# Patient Record
Sex: MALE | Race: BLACK OR AFRICAN AMERICAN | NOT HISPANIC OR LATINO | Employment: FULL TIME | URBAN - METROPOLITAN AREA
[De-identification: names, ages, dates, MRNs, and addresses within clinical notes are randomized per-mention and may not be internally consistent; named-entity substitution may affect disease eponyms.]

---

## 2017-03-01 ENCOUNTER — OFFICE VISIT (OUTPATIENT)
Dept: INTERNAL MEDICINE | Facility: CLINIC | Age: 22
End: 2017-03-01
Payer: OTHER GOVERNMENT

## 2017-03-01 VITALS
WEIGHT: 161.81 LBS | TEMPERATURE: 98 F | SYSTOLIC BLOOD PRESSURE: 112 MMHG | HEIGHT: 73 IN | HEART RATE: 81 BPM | DIASTOLIC BLOOD PRESSURE: 72 MMHG | BODY MASS INDEX: 21.45 KG/M2

## 2017-03-01 DIAGNOSIS — J30.1 SEASONAL ALLERGIC RHINITIS DUE TO POLLEN: Primary | ICD-10-CM

## 2017-03-01 PROCEDURE — 99213 OFFICE O/P EST LOW 20 MIN: CPT | Mod: S$GLB,,, | Performed by: INTERNAL MEDICINE

## 2017-03-01 PROCEDURE — 1160F RVW MEDS BY RX/DR IN RCRD: CPT | Mod: S$GLB,,, | Performed by: INTERNAL MEDICINE

## 2017-03-01 PROCEDURE — 99999 PR PBB SHADOW E&M-EST. PATIENT-LVL III: CPT | Mod: PBBFAC,,, | Performed by: INTERNAL MEDICINE

## 2017-03-01 RX ORDER — BENZONATATE 200 MG/1
200 CAPSULE ORAL 3 TIMES DAILY PRN
Qty: 60 CAPSULE | Refills: 2 | Status: SHIPPED | OUTPATIENT
Start: 2017-03-01 | End: 2019-02-22 | Stop reason: SDUPTHER

## 2017-03-01 RX ORDER — AZELASTINE 1 MG/ML
1 SPRAY, METERED NASAL 2 TIMES DAILY
Qty: 30 ML | Refills: 2 | Status: SHIPPED | OUTPATIENT
Start: 2017-03-01 | End: 2019-02-22 | Stop reason: SDUPTHER

## 2017-03-01 RX ORDER — FLUTICASONE PROPIONATE 50 MCG
1 SPRAY, SUSPENSION (ML) NASAL DAILY
Qty: 16 G | Refills: 2 | Status: SHIPPED | OUTPATIENT
Start: 2017-03-01 | End: 2019-08-14 | Stop reason: ALTCHOICE

## 2017-03-01 NOTE — PROGRESS NOTES
Subjective:       Patient ID: Kendell Harley is a 21 y.o. male.    Chief Complaint: URI    URI    This is a new problem. The current episode started in the past 7 days. The problem has been unchanged. There has been no fever. Associated symptoms include congestion, coughing, rhinorrhea, sneezing and a sore throat. Pertinent negatives include no abdominal pain, chest pain, diarrhea, headaches, nausea, vomiting or wheezing. He has tried antihistamine and decongestant for the symptoms. The treatment provided mild relief.     Review of Systems   Constitutional: Negative for activity change, appetite change and fever.   HENT: Positive for congestion, rhinorrhea, sneezing and sore throat. Negative for postnasal drip.    Respiratory: Positive for cough. Negative for shortness of breath and wheezing.    Cardiovascular: Negative for chest pain and palpitations.   Gastrointestinal: Negative for abdominal pain, blood in stool, constipation, diarrhea, nausea and vomiting.   Genitourinary: Negative for decreased urine volume, difficulty urinating, flank pain and frequency.   Musculoskeletal: Negative for arthralgias.   Neurological: Negative for dizziness, weakness and headaches.       Objective:      Physical Exam   Constitutional: He is oriented to person, place, and time. He appears well-developed and well-nourished. No distress.   HENT:   Head: Normocephalic and atraumatic.   Right Ear: External ear normal.   Left Ear: External ear normal.   Eyes: Conjunctivae and EOM are normal. Pupils are equal, round, and reactive to light.   Neck: Normal range of motion. Neck supple. No thyromegaly present.   Cardiovascular: Normal rate and regular rhythm.    Pulmonary/Chest: Effort normal and breath sounds normal.   Abdominal: Soft. Bowel sounds are normal. He exhibits no mass. There is no tenderness. There is no rebound and no guarding.   Musculoskeletal: Normal range of motion.   Lymphadenopathy:     He has no cervical adenopathy.    Neurological: He is alert and oriented to person, place, and time. He has normal reflexes. He displays normal reflexes. No cranial nerve deficit. He exhibits normal muscle tone. Coordination normal.   Skin: Skin is warm and dry.       Assessment:       No diagnosis found.    Plan:   There are no diagnoses linked to this encounter.

## 2017-03-01 NOTE — MR AVS SNAPSHOT
[facility] Medicine  [address]  [place] [address]  Phone: [phone]  Fax: [phone]                  Kendell Harley   3/1/2017 10:40 AM   Office Visit    Description:  Male : 1995   Provider:  Isela Lowery MD   Department:  [facility] - Internal Medicine           Reason for Visit     URI                To Do List           Goals (5 Years of Data)     None       These Medications        Disp Refills Start End    azelastine (ASTELIN) 137 mcg (0.1 %) nasal spray 30 mL 2 3/1/2017 3/1/2018    1 spray (137 mcg total) by Nasal route 2 (two) times daily. - Nasal    Pharmacy: [facility] [address] AT [facility] Ph #: [phone]       benzonatate (TESSALON) 200 MG capsule 60 capsule 2 3/1/2017     Take 1 capsule (200 mg total) by mouth 3 (three) times daily as needed for Cough. - Oral    Pharmacy: [facility] [address] [place] [address] [address] AT [facility] Ph #: [phone]       fluticasone (FLONASE) 50 mcg/actuation nasal spray 16 g 2 3/1/2017     1 spray by Each Nare route once daily. - Each Nare    Pharmacy: [facility] [address] AT [facility] Ph #: [phone]         [facility] On Call     [facility] On Call Nurse Care Line -  Assistance  Registered nurses in the [facility] provide clinical advisement, health education, appointment booking, and other advisory services.  Call for this free service at [phone].             Medications           Message regarding Medications     Verify the changes and/or additions to your medication regime listed below are the same as discussed with your clinician today.  If any of these changes or additions are incorrect, please notify your healthcare provider.             Verify that the below list of medications is an accurate representation  of the medications you are currently taking.  If none reported, the list may be blank. If incorrect, please contact your healthcare provider. Carry this list with you in case of emergency.           Current Medications     fluticasone (FLONASE) 50 mcg/actuation nasal spray 1 spray by Each Nare route once daily.    azelastine (ASTELIN) 137 mcg (0.1 %) nasal spray 1 spray (137 mcg total) by Nasal route 2 (two) times daily.    benzonatate (TESSALON) 200 MG capsule Take 1 capsule (200 mg total) by mouth 3 (three) times daily as needed for Cough.           Clinical Reference Information           Your Vitals Were     BP                   112/72 (BP Location: Left arm, Patient Position: Sitting, BP Method: Manual)           Blood Pressure          Most Recent Value    BP  112/72      Allergies as of 3/1/2017     No Known Allergies      Immunizations Administered on Date of Encounter - 3/1/2017     None      [facility] Sign-Up     Activating your [facility] account is as easy as 1-2-3!     1) Visit [email or web address], select Sign Up Now, enter this activation code and your date of birth, then select Next.  [email or web address]  Expires: 4/15/2017 11:03 AM      2) Create a username and password to use when you visit [facility] in the future and select a security question in case you lose your password and select Next.    3) Enter your e-mail address and click Sign Up!    Additional Information  If you have questions, please e-mail [email or web address] or call [phone] to talk to our [facility] staff. Remember, [facility] is NOT to be used for urgent needs. For medical emergencies, dial 911.         Language Assistance Services     ATTENTION: Language assistance services are available, free of charge. Please call [phone].      ATENCIÓN: Si habla abañol, tiene a patterson disposición servicios gratuitos de asistencia lingüística. Llame al [phone].     CHÚ Ý: N?u b?n nói Ti?ng Vi?t, có các d?ch v? h? tr? ngôn ng? mi?n phí  dành cho b?n. G?i s? [phone].         [facility] complies with applicable Federal civil rights laws and does not discriminate on the basis of race, color, national origin, age, disability, or sex.

## 2017-07-07 ENCOUNTER — OFFICE VISIT (OUTPATIENT)
Dept: INTERNAL MEDICINE | Facility: CLINIC | Age: 22
End: 2017-07-07
Payer: OTHER GOVERNMENT

## 2017-07-07 VITALS
DIASTOLIC BLOOD PRESSURE: 63 MMHG | SYSTOLIC BLOOD PRESSURE: 117 MMHG | TEMPERATURE: 98 F | BODY MASS INDEX: 19.76 KG/M2 | HEART RATE: 75 BPM | WEIGHT: 158.94 LBS | HEIGHT: 75 IN

## 2017-07-07 DIAGNOSIS — T63.441A STING FROM HORNET, WASP, OR BEE, ACCIDENTAL OR UNINTENTIONAL, INITIAL ENCOUNTER: ICD-10-CM

## 2017-07-07 DIAGNOSIS — T63.461A STING FROM HORNET, WASP, OR BEE, ACCIDENTAL OR UNINTENTIONAL, INITIAL ENCOUNTER: ICD-10-CM

## 2017-07-07 DIAGNOSIS — T63.451A STING FROM HORNET, WASP, OR BEE, ACCIDENTAL OR UNINTENTIONAL, INITIAL ENCOUNTER: ICD-10-CM

## 2017-07-07 DIAGNOSIS — J30.1 CHRONIC SEASONAL ALLERGIC RHINITIS DUE TO POLLEN: ICD-10-CM

## 2017-07-07 PROBLEM — J30.9 ALLERGIC RHINITIS: Status: ACTIVE | Noted: 2017-07-07

## 2017-07-07 PROCEDURE — 99999 PR PBB SHADOW E&M-EST. PATIENT-LVL III: CPT | Mod: PBBFAC,,,

## 2017-07-07 RX ORDER — IPRATROPIUM BROMIDE 42 UG/1
2 SPRAY, METERED NASAL 3 TIMES DAILY
Qty: 15 ML | Refills: 3 | Status: SHIPPED | OUTPATIENT
Start: 2017-07-07 | End: 2018-05-28 | Stop reason: SDUPTHER

## 2017-07-07 RX ORDER — MONTELUKAST SODIUM 10 MG/1
10 TABLET ORAL DAILY
Qty: 30 TABLET | Refills: 3 | Status: SHIPPED | OUTPATIENT
Start: 2017-07-07 | End: 2017-08-06

## 2017-07-07 NOTE — PROGRESS NOTES
INTERNAL MEDICINE RESIDENT CLINIC  CLINIC NOTE    Patient Name: Kendell Harley  YOB: 1995    PRESENTING HISTORY   Chief Complaint: Bee sting    History of Present Illness:  Mr. Kendell Harley is a 21 y.o. male w/ significant PMHx of seasonal allergies presenting with a bee sting 2 days ago which has produced residual local pain and mild swelling to his right neck for the last 2 days. Witnessed sting by bumblebee, some scratching since. No fever, chills, other systemic symptoms. Has been keeping tobacco leaves on the site to remove toxins. Tetanus UTD    Also complains of a several year history of allergies manifesting as nasal discharge, sinus pain, bags under eyes, sore throat and pain. Sxs are intermittent and moderate in severity. Has been on astelin, tessalon pearls and flonase. Has tried Claritin in the past without noticeable improvement in sxs. Looking for better control of sxs.     Review of Systems:  Constitutional: no fever or chills  Eyes: no visual changes  ENT: + nasal congestion. + PND  Respiratory: + cough, -sputum production  Cardiovascular: no chest pain or palpitations  Gastrointestinal: no nausea or vomiting, no abdominal pain or change in bowel habits  Genitourinary: no hematuria or dysuria  Musculoskeletal: no arthralgias or myalgias  Dermatologic: + redness and swelling to right neck  Neurological: no seizures or tremors    PAST HISTORY:     Past Medical History:   Diagnosis Date    Mononucleosis     diagnosed 9/20/12       No past surgical history on file.    Family History   Problem Relation Age of Onset    Diabetes Mother     Hypertension Mother     Hypertension Father     Diabetes Maternal Grandmother     Hypertension Maternal Grandmother     Cancer Maternal Grandmother     Hypertension Paternal Grandmother     Sudden death Neg Hx     Congenital heart disease Neg Hx        Social History     Social History    Marital status: Single     Spouse name: N/A     "Number of children: N/A    Years of education: N/A     Occupational History    Food       Social History Main Topics    Smoking status: Passive Smoke Exposure - Never Smoker    Smokeless tobacco: None      Comment: father smokes    Alcohol use None    Drug use: No    Sexual activity: Yes     Partners: Female     Birth control/ protection: Condom     Other Topics Concern    None     Social History Narrative    None       MEDICATIONS & ALLERGIES:     Current Outpatient Prescriptions on File Prior to Visit   Medication Sig    azelastine (ASTELIN) 137 mcg (0.1 %) nasal spray 1 spray (137 mcg total) by Nasal route 2 (two) times daily.    benzonatate (TESSALON) 200 MG capsule Take 1 capsule (200 mg total) by mouth 3 (three) times daily as needed for Cough.    fluticasone (FLONASE) 50 mcg/actuation nasal spray 1 spray by Each Nare route once daily.     No current facility-administered medications on file prior to visit.        Review of patient's allergies indicates:  No Known Allergies    OBJECTIVE:   Vital Signs:  Vitals:    07/07/17 1334   BP: 117/63   Pulse: 75   Temp: 98.4 °F (36.9 °C)   TempSrc: Oral   Weight: 72.1 kg (158 lb 15.2 oz)   Height: 6' 3" (1.905 m)        Physical Exam:   General:  Thin male in no acute distress  HEENT:  Nasal turbinates erythematous and edematous. No sinus tenderness. Throat clear with no erythema, exudate or cobble stoning.   CVS:  RRR. No murmurs appreciated  Resp:  Lungs clear to auscultation, no wheezes, rales, rhonchi, cough  MSK:  No joint swelling or erythema  Skin:  Small skin break to the right base of neck consistent with insect sting. Surrounding erythema and mild induration in ellipsoid pattern, approx 6 cm long and 3 centimeters wide. No fluctuance or warmth.   Neuro:  CNII-XII grossly intact. Nl gait  Psych:  Alert and oriented to person, place, and time    ASSESSMENT & PLAN:     Kendell STEPHENS was seen today for insect bite and sinus " problem.    Diagnoses and all orders for this visit:    Seasonal allergic rhinitis due to pollen  -Chronic and uncontrolled on current regimen  -Instructed on nasal rinse technique including use of distilled water.   -     ipratropium (ATROVENT) 0.06 % nasal spray; 2 sprays by Nasal route 3 (three) times daily.  -     montelukast (SINGULAIR) 10 mg tablet; Take 1 tablet (10 mg total) by mouth once daily.    Sting from hornet, wasp, or bee  -With local reaction  -Do not suspect cellulitis  -Instructed on surveillance of site and assessing for spreading of erythema      Dread Rodriguez MD   Internal Medicine PGY-1  [phone]

## 2017-09-05 ENCOUNTER — TELEPHONE (OUTPATIENT)
Dept: INTERNAL MEDICINE | Facility: CLINIC | Age: 22
End: 2017-09-05

## 2017-09-05 NOTE — TELEPHONE ENCOUNTER
----- Message from Natalie Haile sent at 9/5/2017 11:09 AM CDT -----  Contact: Klever/NORRIS/[phone]  Cc: MD Neyda Castro Jr. called in regards needing a 90 days supplies for the ipratropium (ATROVENT) 0.06 % nasal spray. Please call and advise.         Thank you!!!

## 2018-05-28 RX ORDER — IPRATROPIUM BROMIDE 42 UG/1
2 SPRAY, METERED NASAL 3 TIMES DAILY
Qty: 15 ML | Refills: 3 | Status: SHIPPED | OUTPATIENT
Start: 2018-05-28 | End: 2018-05-30 | Stop reason: SDUPTHER

## 2018-05-31 RX ORDER — IPRATROPIUM BROMIDE 42 UG/1
2 SPRAY, METERED NASAL 3 TIMES DAILY
Qty: 45 ML | Refills: 3 | Status: SHIPPED | OUTPATIENT
Start: 2018-05-31 | End: 2018-08-29

## 2019-02-22 ENCOUNTER — OFFICE VISIT (OUTPATIENT)
Dept: INTERNAL MEDICINE | Facility: CLINIC | Age: 24
End: 2019-02-22
Payer: OTHER GOVERNMENT

## 2019-02-22 ENCOUNTER — TELEPHONE (OUTPATIENT)
Dept: INTERNAL MEDICINE | Facility: CLINIC | Age: 24
End: 2019-02-22

## 2019-02-22 ENCOUNTER — IMMUNIZATION (OUTPATIENT)
Dept: INTERNAL MEDICINE | Facility: CLINIC | Age: 24
End: 2019-02-22
Payer: OTHER GOVERNMENT

## 2019-02-22 VITALS
OXYGEN SATURATION: 96 % | WEIGHT: 170 LBS | TEMPERATURE: 98 F | HEART RATE: 77 BPM | DIASTOLIC BLOOD PRESSURE: 70 MMHG | SYSTOLIC BLOOD PRESSURE: 120 MMHG | BODY MASS INDEX: 21.14 KG/M2 | HEIGHT: 75 IN

## 2019-02-22 DIAGNOSIS — Z00.00 ANNUAL PHYSICAL EXAM: Primary | ICD-10-CM

## 2019-02-22 DIAGNOSIS — J30.1 SEASONAL ALLERGIC RHINITIS DUE TO POLLEN: ICD-10-CM

## 2019-02-22 DIAGNOSIS — Z23 NEED FOR INFLUENZA VACCINATION: ICD-10-CM

## 2019-02-22 PROCEDURE — 99395 PREV VISIT EST AGE 18-39: CPT | Mod: S$GLB,,, | Performed by: FAMILY MEDICINE

## 2019-02-22 PROCEDURE — 90686 IIV4 VACC NO PRSV 0.5 ML IM: CPT | Mod: S$GLB,,, | Performed by: NURSE PRACTITIONER

## 2019-02-22 PROCEDURE — 99999 PR PBB SHADOW E&M-EST. PATIENT-LVL III: CPT | Mod: PBBFAC,,, | Performed by: FAMILY MEDICINE

## 2019-02-22 PROCEDURE — 90686 FLU VACCINE (QUAD) GREATER THAN OR EQUAL TO 3YO PRESERVATIVE FREE IM: ICD-10-PCS | Mod: S$GLB,,, | Performed by: NURSE PRACTITIONER

## 2019-02-22 PROCEDURE — 90471 IMMUNIZATION ADMIN: CPT | Mod: S$GLB,,, | Performed by: NURSE PRACTITIONER

## 2019-02-22 PROCEDURE — 99395 PR PREVENTIVE VISIT,EST,18-39: ICD-10-PCS | Mod: S$GLB,,, | Performed by: FAMILY MEDICINE

## 2019-02-22 PROCEDURE — 90471 FLU VACCINE (QUAD) GREATER THAN OR EQUAL TO 3YO PRESERVATIVE FREE IM: ICD-10-PCS | Mod: S$GLB,,, | Performed by: NURSE PRACTITIONER

## 2019-02-22 PROCEDURE — 99999 PR PBB SHADOW E&M-EST. PATIENT-LVL III: ICD-10-PCS | Mod: PBBFAC,,, | Performed by: FAMILY MEDICINE

## 2019-02-22 RX ORDER — BENZONATATE 200 MG/1
200 CAPSULE ORAL 3 TIMES DAILY PRN
Qty: 60 CAPSULE | Refills: 2 | Status: SHIPPED | OUTPATIENT
Start: 2019-02-22 | End: 2019-06-13 | Stop reason: SDUPTHER

## 2019-02-22 RX ORDER — AZELASTINE 1 MG/ML
1 SPRAY, METERED NASAL 2 TIMES DAILY
Qty: 30 ML | Refills: 2 | Status: SHIPPED | OUTPATIENT
Start: 2019-02-22 | End: 2019-03-27 | Stop reason: SDUPTHER

## 2019-02-22 NOTE — PROGRESS NOTES
Subjective:      Patient ID: Kendell Harley is a 23 y.o. male.    Chief Complaint: Establish Care      HPI:  Kendell Harley is a 23 year old male with allergic rhinitis and history of palpitations who presents to clinic today to establish care.    Allergic rhinitis:  Previously used benzonatate PRN associated cough.  Also used to use azelastine nasal spray with improvement.  Requests refills on benzonatate and azelastine.  Used to use Flonase in the past but was changed to azelastine by previous PCP.    Health Care Maintenance:  Influenza vaccination:  Will get today.  Last tetanus booster:  11/5/13      Past Medical History:   Diagnosis Date    Mononucleosis     diagnosed 9/20/12       History reviewed. No pertinent surgical history.    Family History   Problem Relation Age of Onset    Diabetes Mother     Hypertension Mother     Hypertension Father     Diabetes Maternal Grandmother     Hypertension Maternal Grandmother     Cancer Maternal Grandmother     Hypertension Paternal Grandmother     Sudden death Neg Hx     Congenital heart disease Neg Hx        Social History     Socioeconomic History    Marital status: Single     Spouse name: None    Number of children: None    Years of education: None    Highest education level: None   Social Needs    Financial resource strain: None    Food insecurity - worry: None    Food insecurity - inability: None    Transportation needs - medical: None    Transportation needs - non-medical: None   Occupational History    None   Tobacco Use    Smoking status: Passive Smoke Exposure - Never Smoker    Smokeless tobacco: Never Used    Tobacco comment: father smokes   Substance and Sexual Activity    Alcohol use: Yes     Frequency: 2-4 times a month     Drinks per session: 3 or 4    Drug use: No    Sexual activity: Yes     Partners: Female     Birth control/protection: Condom   Other Topics Concern    None   Social History Narrative    None       Review of  "Systems   Constitutional: Negative for chills, fatigue and fever.   HENT: Positive for congestion. Negative for hearing loss, nosebleeds, rhinorrhea, sore throat and trouble swallowing.    Eyes: Negative for pain and visual disturbance.   Respiratory: Positive for cough. Negative for shortness of breath and wheezing.    Cardiovascular: Negative for chest pain and palpitations.   Gastrointestinal: Negative for abdominal distention, abdominal pain, constipation, diarrhea, nausea and vomiting.   Genitourinary: Negative for difficulty urinating, dysuria, frequency, hematuria and urgency.   Musculoskeletal: Negative for arthralgias, back pain and myalgias.   Skin: Negative for color change and rash.   Neurological: Negative for dizziness, syncope, speech difficulty, weakness, numbness and headaches.   Psychiatric/Behavioral: Negative for agitation, behavioral problems and confusion. The patient is not nervous/anxious.      Objective:     Vitals:    02/22/19 1024   BP: 120/70   BP Location: Right arm   Patient Position: Sitting   BP Method: Medium (Manual)   Pulse: 77   Temp: 97.9 °F (36.6 °C)   TempSrc: Oral   SpO2: 96%   Weight: 77.1 kg (170 lb)   Height: 6' 3" (1.905 m)       Physical Exam   Constitutional: He appears well-developed and well-nourished. He is cooperative. No distress.   HENT:   Head: Normocephalic and atraumatic.   Right Ear: Hearing and external ear normal.   Left Ear: Hearing and external ear normal.   Nose: Nose normal. No rhinorrhea. No epistaxis.   Mouth/Throat: Oropharynx is clear and moist and mucous membranes are normal. No oral lesions.   Eyes: Conjunctivae, EOM and lids are normal. Pupils are equal, round, and reactive to light. Right eye exhibits no discharge. Left eye exhibits no discharge.   Neck: Trachea normal and normal range of motion. Neck supple. No tracheal deviation present.   Cardiovascular: Normal rate, regular rhythm and normal heart sounds. Exam reveals no gallop and no friction " rub.   No murmur heard.  Pulmonary/Chest: Effort normal and breath sounds normal. No respiratory distress. He has no wheezes. He has no rales.   Abdominal: Soft. Bowel sounds are normal. He exhibits no distension. There is no tenderness. There is no rebound and no guarding.   Musculoskeletal: Normal range of motion. He exhibits no edema or deformity.   Neurological: He is alert. No cranial nerve deficit. He exhibits normal muscle tone.   Skin: Skin is warm and dry. No rash noted.   Psychiatric: He has a normal mood and affect. His speech is normal and behavior is normal. Judgment and thought content normal. Cognition and memory are normal.   Nursing note and vitals reviewed.     Assessment:      1. Annual physical exam    2. Seasonal allergic rhinitis due to pollen    3. Need for influenza vaccination      Plan:   Kendell STEPHENS was seen today for establish care.    Diagnoses and all orders for this visit:    Annual physical exam  -     CBC auto differential; Future  -     Comprehensive metabolic panel; Future  -     Lipid panel; Future    Seasonal allergic rhinitis due to pollen  -     azelastine (ASTELIN) 137 mcg (0.1 %) nasal spray; 1 spray (137 mcg total) by Nasal route 2 (two) times daily.  -     benzonatate (TESSALON) 200 MG capsule; Take 1 capsule (200 mg total) by mouth 3 (three) times daily as needed for Cough.  -     Symptoms stable; medications refilled; call/return to clinic if no improvement or for any worsening    Need for influenza vaccination        -     Fluzone to be administered today.

## 2019-02-23 ENCOUNTER — LAB VISIT (OUTPATIENT)
Dept: LAB | Facility: HOSPITAL | Age: 24
End: 2019-02-23
Payer: OTHER GOVERNMENT

## 2019-02-23 DIAGNOSIS — Z00.00 ANNUAL PHYSICAL EXAM: ICD-10-CM

## 2019-02-23 LAB
ALBUMIN SERPL BCP-MCNC: 4.2 G/DL
ALP SERPL-CCNC: 77 U/L
ALT SERPL W/O P-5'-P-CCNC: 20 U/L
ANION GAP SERPL CALC-SCNC: 7 MMOL/L
AST SERPL-CCNC: 23 U/L
BASOPHILS # BLD AUTO: 0.03 K/UL
BASOPHILS NFR BLD: 0.7 %
BILIRUB SERPL-MCNC: 0.6 MG/DL
BUN SERPL-MCNC: 7 MG/DL
CALCIUM SERPL-MCNC: 9.3 MG/DL
CHLORIDE SERPL-SCNC: 102 MMOL/L
CHOLEST SERPL-MCNC: 149 MG/DL
CHOLEST/HDLC SERPL: 1.9 {RATIO}
CO2 SERPL-SCNC: 31 MMOL/L
CREAT SERPL-MCNC: 1 MG/DL
DIFFERENTIAL METHOD: ABNORMAL
EOSINOPHIL # BLD AUTO: 0 K/UL
EOSINOPHIL NFR BLD: 0.2 %
ERYTHROCYTE [DISTWIDTH] IN BLOOD BY AUTOMATED COUNT: 13.7 %
EST. GFR  (AFRICAN AMERICAN): >60 ML/MIN/1.73 M^2
EST. GFR  (NON AFRICAN AMERICAN): >60 ML/MIN/1.73 M^2
GLUCOSE SERPL-MCNC: 88 MG/DL
HCT VFR BLD AUTO: 47.1 %
HDLC SERPL-MCNC: 77 MG/DL
HDLC SERPL: 51.7 %
HGB BLD-MCNC: 15.6 G/DL
LDLC SERPL CALC-MCNC: 59.8 MG/DL
LYMPHOCYTES # BLD AUTO: 0.7 K/UL
LYMPHOCYTES NFR BLD: 16.5 %
MCH RBC QN AUTO: 30.7 PG
MCHC RBC AUTO-ENTMCNC: 33.1 G/DL
MCV RBC AUTO: 93 FL
MONOCYTES # BLD AUTO: 0.6 K/UL
MONOCYTES NFR BLD: 14.8 %
NEUTROPHILS # BLD AUTO: 2.8 K/UL
NEUTROPHILS NFR BLD: 67.6 %
NONHDLC SERPL-MCNC: 72 MG/DL
PLATELET # BLD AUTO: 226 K/UL
PMV BLD AUTO: 9.2 FL
POTASSIUM SERPL-SCNC: 3.9 MMOL/L
PROT SERPL-MCNC: 7.2 G/DL
RBC # BLD AUTO: 5.08 M/UL
SODIUM SERPL-SCNC: 140 MMOL/L
TRIGL SERPL-MCNC: 61 MG/DL
WBC # BLD AUTO: 4.12 K/UL

## 2019-02-23 PROCEDURE — 36415 COLL VENOUS BLD VENIPUNCTURE: CPT

## 2019-02-23 PROCEDURE — 80053 COMPREHEN METABOLIC PANEL: CPT

## 2019-02-23 PROCEDURE — 80061 LIPID PANEL: CPT

## 2019-02-23 PROCEDURE — 85025 COMPLETE CBC W/AUTO DIFF WBC: CPT

## 2019-03-27 DIAGNOSIS — J30.1 SEASONAL ALLERGIC RHINITIS DUE TO POLLEN: ICD-10-CM

## 2019-03-27 RX ORDER — AZELASTINE 1 MG/ML
1 SPRAY, METERED NASAL 2 TIMES DAILY
Qty: 30 ML | Refills: 2 | Status: SHIPPED | OUTPATIENT
Start: 2019-03-27 | End: 2019-06-13 | Stop reason: SDUPTHER

## 2019-06-13 DIAGNOSIS — J30.1 SEASONAL ALLERGIC RHINITIS DUE TO POLLEN: ICD-10-CM

## 2019-06-13 RX ORDER — AZELASTINE 1 MG/ML
1 SPRAY, METERED NASAL 2 TIMES DAILY
Qty: 30 ML | Refills: 2 | Status: SHIPPED | OUTPATIENT
Start: 2019-06-13 | End: 2020-05-08 | Stop reason: SDUPTHER

## 2019-06-13 RX ORDER — BENZONATATE 200 MG/1
200 CAPSULE ORAL 3 TIMES DAILY PRN
Qty: 60 CAPSULE | Refills: 2 | Status: SHIPPED | OUTPATIENT
Start: 2019-06-13 | End: 2020-03-26 | Stop reason: SDUPTHER

## 2019-06-17 ENCOUNTER — OFFICE VISIT (OUTPATIENT)
Dept: URGENT CARE | Facility: CLINIC | Age: 24
End: 2019-06-17
Payer: OTHER GOVERNMENT

## 2019-06-17 VITALS
WEIGHT: 170 LBS | RESPIRATION RATE: 16 BRPM | HEART RATE: 86 BPM | HEIGHT: 75 IN | DIASTOLIC BLOOD PRESSURE: 73 MMHG | SYSTOLIC BLOOD PRESSURE: 126 MMHG | OXYGEN SATURATION: 100 % | BODY MASS INDEX: 21.14 KG/M2 | TEMPERATURE: 99 F

## 2019-06-17 DIAGNOSIS — J06.9 URI, ACUTE: Primary | ICD-10-CM

## 2019-06-17 PROCEDURE — 96372 THER/PROPH/DIAG INJ SC/IM: CPT | Mod: S$GLB,,, | Performed by: PREVENTIVE MEDICINE

## 2019-06-17 PROCEDURE — 96372 PR INJECTION,THERAP/PROPH/DIAG2ST, IM OR SUBCUT: ICD-10-PCS | Mod: S$GLB,,, | Performed by: PREVENTIVE MEDICINE

## 2019-06-17 PROCEDURE — 99214 PR OFFICE/OUTPT VISIT, EST, LEVL IV, 30-39 MIN: ICD-10-PCS | Mod: 25,S$GLB,, | Performed by: PHYSICIAN ASSISTANT

## 2019-06-17 PROCEDURE — 99214 OFFICE O/P EST MOD 30 MIN: CPT | Mod: 25,S$GLB,, | Performed by: PHYSICIAN ASSISTANT

## 2019-06-17 RX ORDER — BETAMETHASONE SODIUM PHOSPHATE AND BETAMETHASONE ACETATE 3; 3 MG/ML; MG/ML
9 INJECTION, SUSPENSION INTRA-ARTICULAR; INTRALESIONAL; INTRAMUSCULAR; SOFT TISSUE
Status: COMPLETED | OUTPATIENT
Start: 2019-06-17 | End: 2019-06-17

## 2019-06-17 RX ADMIN — BETAMETHASONE SODIUM PHOSPHATE AND BETAMETHASONE ACETATE 9 MG: 3; 3 INJECTION, SUSPENSION INTRA-ARTICULAR; INTRALESIONAL; INTRAMUSCULAR; SOFT TISSUE at 11:06

## 2019-06-17 NOTE — PROGRESS NOTES
"Subjective:       Patient ID: Kendell Harley is a 23 y.o. male.    Vitals:  height is 6' 3" (1.905 m) and weight is 77.1 kg (170 lb). His temperature is 98.7 °F (37.1 °C). His blood pressure is 126/73 and his pulse is 86. His respiration is 16 and oxygen saturation is 100%.     Chief Complaint: Sinus Problem    Sinus Problem   This is a new problem. The current episode started 1 to 4 weeks ago (x3 wks ). The problem has been gradually worsening since onset. There has been no fever. His pain is at a severity of 10/10. The pain is severe. Associated symptoms include chills, congestion, coughing, diaphoresis, ear pain, headaches, sinus pressure and sneezing. Pertinent negatives include no shortness of breath or sore throat. Treatments tried: nasal spray/claritin. tessalon pearls. The treatment provided mild relief.       Constitution: Positive for chills, sweating and fatigue. Negative for fever.   HENT: Positive for ear pain, congestion, postnasal drip, sinus pain and sinus pressure. Negative for sore throat and voice change.    Neck: Negative for painful lymph nodes.   Eyes: Negative for eye redness.   Respiratory: Positive for cough. Negative for chest tightness, sputum production, bloody sputum, COPD, shortness of breath, stridor, wheezing and asthma.    Gastrointestinal: Negative for nausea and vomiting.   Musculoskeletal: Negative for muscle ache.   Skin: Negative for rash.   Allergic/Immunologic: Positive for sneezing. Negative for seasonal allergies and asthma.   Neurological: Positive for headaches.   Hematologic/Lymphatic: Negative for swollen lymph nodes.       Objective:      Physical Exam   Constitutional: He is oriented to person, place, and time. Vital signs are normal. He appears well-developed and well-nourished. He is active and cooperative.  Non-toxic appearance. He does not have a sickly appearance. He does not appear ill. No distress.   HENT:   Head: Normocephalic and atraumatic.   Right Ear: " Hearing, tympanic membrane, external ear and ear canal normal.   Left Ear: Hearing, tympanic membrane, external ear and ear canal normal.   Nose: Mucosal edema and rhinorrhea present. No nasal deformity. No epistaxis. Right sinus exhibits no maxillary sinus tenderness and no frontal sinus tenderness. Left sinus exhibits no maxillary sinus tenderness and no frontal sinus tenderness.   Mouth/Throat: Uvula is midline and mucous membranes are normal. No trismus in the jaw. Normal dentition. No uvula swelling. Posterior oropharyngeal erythema (mild) present. No posterior oropharyngeal edema. No tonsillar exudate.   Eyes: Pupils are equal, round, and reactive to light. Conjunctivae, EOM and lids are normal. No scleral icterus.   Sclera clear bilat   Neck: Trachea normal, full passive range of motion without pain and phonation normal. Neck supple.   Cardiovascular: Normal rate, regular rhythm, normal heart sounds and intact distal pulses.   Pulses:       Radial pulses are 1+ on the right side, and 1+ on the left side.   Pulmonary/Chest: Effort normal and breath sounds normal. No respiratory distress.   Musculoskeletal: Normal range of motion. He exhibits no edema or deformity.   Neurological: He is alert and oriented to person, place, and time. He has normal strength. Gait normal.   Skin: Skin is warm, dry and intact. Capillary refill takes less than 2 seconds. No rash noted. He is not diaphoretic. No pallor.   Psychiatric: He has a normal mood and affect. His speech is normal and behavior is normal. Judgment and thought content normal. Cognition and memory are normal.   Nursing note and vitals reviewed.      Assessment:       1. URI, acute        Plan:         URI, acute  -     betamethasone acetate-betamethasone sodium phosphate injection 9 mg      Patient Instructions   Continue Astelin nasal spray and benzonatate. Add Claritin D.     Please return here or go to the Emergency Department for any concerns or worsening of  condition.    Please follow up with your primary care doctor or specialist as needed.    If you  smoke, please stop smoking.

## 2019-06-17 NOTE — PATIENT INSTRUCTIONS
Continue Astelin nasal spray and benzonatate. Add Claritin D.     Please return here or go to the Emergency Department for any concerns or worsening of condition.    Please follow up with your primary care doctor or specialist as needed.    If you  smoke, please stop smoking.

## 2019-08-13 ENCOUNTER — PATIENT MESSAGE (OUTPATIENT)
Dept: INTERNAL MEDICINE | Facility: CLINIC | Age: 24
End: 2019-08-13

## 2019-08-14 ENCOUNTER — PATIENT MESSAGE (OUTPATIENT)
Dept: INTERNAL MEDICINE | Facility: CLINIC | Age: 24
End: 2019-08-14

## 2019-08-14 ENCOUNTER — OFFICE VISIT (OUTPATIENT)
Dept: INTERNAL MEDICINE | Facility: CLINIC | Age: 24
End: 2019-08-14
Payer: OTHER GOVERNMENT

## 2019-08-14 VITALS
OXYGEN SATURATION: 98 % | HEIGHT: 75 IN | BODY MASS INDEX: 20.45 KG/M2 | HEART RATE: 92 BPM | SYSTOLIC BLOOD PRESSURE: 140 MMHG | WEIGHT: 164.44 LBS | TEMPERATURE: 99 F | DIASTOLIC BLOOD PRESSURE: 82 MMHG

## 2019-08-14 DIAGNOSIS — J02.9 SORE THROAT: ICD-10-CM

## 2019-08-14 DIAGNOSIS — J30.1 SEASONAL ALLERGIC RHINITIS DUE TO POLLEN: Primary | ICD-10-CM

## 2019-08-14 LAB — DEPRECATED S PYO AG THROAT QL EIA: NEGATIVE

## 2019-08-14 PROCEDURE — 99999 PR PBB SHADOW E&M-EST. PATIENT-LVL IV: ICD-10-PCS | Mod: PBBFAC,,, | Performed by: FAMILY MEDICINE

## 2019-08-14 PROCEDURE — 99999 PR PBB SHADOW E&M-EST. PATIENT-LVL IV: CPT | Mod: PBBFAC,,, | Performed by: FAMILY MEDICINE

## 2019-08-14 PROCEDURE — 99213 PR OFFICE/OUTPT VISIT, EST, LEVL III, 20-29 MIN: ICD-10-PCS | Mod: S$GLB,,, | Performed by: FAMILY MEDICINE

## 2019-08-14 PROCEDURE — 87880 STREP A ASSAY W/OPTIC: CPT

## 2019-08-14 PROCEDURE — 99213 OFFICE O/P EST LOW 20 MIN: CPT | Mod: S$GLB,,, | Performed by: FAMILY MEDICINE

## 2019-08-14 PROCEDURE — 87081 CULTURE SCREEN ONLY: CPT

## 2019-08-14 RX ORDER — TRIAMCINOLONE ACETONIDE 55 UG/1
2 SPRAY, METERED NASAL DAILY
Refills: 0 | COMMUNITY
Start: 2019-08-14 | End: 2020-05-08 | Stop reason: SDUPTHER

## 2019-08-14 RX ORDER — LEVOCETIRIZINE DIHYDROCHLORIDE 5 MG/1
5 TABLET, FILM COATED ORAL NIGHTLY
Qty: 30 TABLET | Refills: 11 | Status: SHIPPED | OUTPATIENT
Start: 2019-08-14 | End: 2020-03-30

## 2019-08-14 NOTE — PROGRESS NOTES
Subjective:      Patient ID: Kendell Harley is a 24 y.o. male.    Chief Complaint: Sinus Problem (life long); Sore Throat (x 2 weeks); and Chills (a while )      HPI:  Kendell Harley is a 24 year old male with allergic rhinitis who presents to clinic complaining of sore throat, sinus problems, and chills.    States he has had worsening sore throat over the past 3 weeks.  Endorses diaphoresis over the past week, chills.  Endorses associated odynophagia and shortness of breath for the past 2 days, denies associated dysphagia, hearing changes, otorrhea, otalgia, epistaxis, chest pain.  Taking Azelastine nasal spray, benzonatate, and Claritin D.  Has tried Flonase in the past without significant improvement.  Has not tried Xyzal.  Denies any recent known sick contacts.  Enquires about going to meet with an allergist.      Past Medical History:   Diagnosis Date    Mononucleosis     diagnosed 9/20/12       History reviewed. No pertinent surgical history.    Family History   Problem Relation Age of Onset    Diabetes Mother     Hypertension Mother     Hypertension Father     Diabetes Maternal Grandmother     Hypertension Maternal Grandmother     Cancer Maternal Grandmother     Hypertension Paternal Grandmother     Sudden death Neg Hx     Congenital heart disease Neg Hx        Social History     Socioeconomic History    Marital status: Single     Spouse name: Not on file    Number of children: Not on file    Years of education: Not on file    Highest education level: Not on file   Occupational History    Not on file   Social Needs    Financial resource strain: Not on file    Food insecurity:     Worry: Not on file     Inability: Not on file    Transportation needs:     Medical: Not on file     Non-medical: Not on file   Tobacco Use    Smoking status: Passive Smoke Exposure - Never Smoker    Smokeless tobacco: Never Used    Tobacco comment: father smokes   Substance and Sexual Activity    Alcohol use:  "Yes     Frequency: 2-4 times a month     Drinks per session: 3 or 4    Drug use: No    Sexual activity: Yes     Partners: Female     Birth control/protection: Condom   Lifestyle    Physical activity:     Days per week: Not on file     Minutes per session: Not on file    Stress: Not on file   Relationships    Social connections:     Talks on phone: Not on file     Gets together: Not on file     Attends Religion service: Not on file     Active member of club or organization: Not on file     Attends meetings of clubs or organizations: Not on file     Relationship status: Not on file   Other Topics Concern    Not on file   Social History Narrative    Not on file       Review of Systems   Constitutional: Positive for chills. Negative for fatigue and fever.   HENT: Positive for congestion, postnasal drip, rhinorrhea and sore throat. Negative for hearing loss, nosebleeds and trouble swallowing.    Eyes: Negative for pain and visual disturbance.   Respiratory: Negative for cough, shortness of breath and wheezing.    Cardiovascular: Negative for chest pain and palpitations.   Gastrointestinal: Negative for abdominal distention, abdominal pain, constipation, diarrhea, nausea and vomiting.   Genitourinary: Negative for difficulty urinating, dysuria, frequency, hematuria and urgency.   Musculoskeletal: Negative for arthralgias, back pain and myalgias.   Skin: Negative for color change and rash.   Neurological: Negative for dizziness, syncope, speech difficulty, weakness, numbness and headaches.   Psychiatric/Behavioral: Negative for agitation, behavioral problems and confusion. The patient is not nervous/anxious.      Objective:     Vitals:    08/14/19 1143   BP: (!) 140/82   BP Location: Left arm   Patient Position: Sitting   BP Method: Medium (Manual)   Pulse: 92   Temp: 98.7 °F (37.1 °C)   TempSrc: Oral   SpO2: 98%   Weight: 74.6 kg (164 lb 7.4 oz)   Height: 6' 3" (1.905 m)       Physical Exam   Constitutional: He " appears well-developed and well-nourished. He is cooperative. No distress.   HENT:   Head: Normocephalic and atraumatic.   Right Ear: Hearing, tympanic membrane, external ear and ear canal normal.   Left Ear: Hearing, tympanic membrane, external ear and ear canal normal.   Nose: Rhinorrhea present. No epistaxis.   Mouth/Throat: Mucous membranes are normal. No oral lesions. Oropharyngeal exudate and posterior oropharyngeal erythema present. No posterior oropharyngeal edema or tonsillar abscesses.   White exudate noted to surface of right tonsil.   Eyes: Pupils are equal, round, and reactive to light. Conjunctivae, EOM and lids are normal. Right eye exhibits no discharge. Left eye exhibits no discharge.   Neck: Trachea normal and normal range of motion. Neck supple. No tracheal deviation present.   Cardiovascular: Normal rate, regular rhythm and normal heart sounds. Exam reveals no gallop and no friction rub.   No murmur heard.  Pulmonary/Chest: Effort normal and breath sounds normal. No respiratory distress. He has no wheezes. He has no rales.   Abdominal: Soft. Bowel sounds are normal. He exhibits no distension. There is no tenderness. There is no rebound and no guarding.   Musculoskeletal: Normal range of motion. He exhibits no edema or deformity.   Neurological: He is alert. No cranial nerve deficit. He exhibits normal muscle tone.   Skin: Skin is warm and dry. No rash noted.   Psychiatric: He has a normal mood and affect. His speech is normal and behavior is normal. Judgment and thought content normal. Cognition and memory are normal.   Nursing note and vitals reviewed.     Assessment:      1. Seasonal allergic rhinitis due to pollen    2. Sore throat      Plan:   Kendell STEPHENS was seen today for sinus problem, sore throat and chills.    Diagnoses and all orders for this visit:    Seasonal allergic rhinitis due to pollen  -     Switch from Claritin to levocetirizine (XYZAL) 5 MG tablet; Take 1 tablet (5 mg total) by  mouth every evening.  -     Switch from azelastine to triamcinolone (NASACORT) 55 mcg nasal inhaler; 2 sprays by Nasal route once daily.  -     Ambulatory Referral to Allergy    Sore throat  -     THROAT SCREEN, RAPID

## 2019-08-16 LAB — BACTERIA THROAT CULT: NORMAL

## 2019-09-11 ENCOUNTER — LAB VISIT (OUTPATIENT)
Dept: LAB | Facility: HOSPITAL | Age: 24
End: 2019-09-11
Attending: ALLERGY & IMMUNOLOGY
Payer: OTHER GOVERNMENT

## 2019-09-11 ENCOUNTER — OFFICE VISIT (OUTPATIENT)
Dept: ALLERGY | Facility: CLINIC | Age: 24
End: 2019-09-11
Payer: OTHER GOVERNMENT

## 2019-09-11 VITALS
DIASTOLIC BLOOD PRESSURE: 82 MMHG | SYSTOLIC BLOOD PRESSURE: 118 MMHG | HEIGHT: 75 IN | WEIGHT: 168 LBS | BODY MASS INDEX: 20.89 KG/M2

## 2019-09-11 DIAGNOSIS — J31.0 CHRONIC RHINITIS: Primary | ICD-10-CM

## 2019-09-11 DIAGNOSIS — J31.0 CHRONIC RHINITIS: ICD-10-CM

## 2019-09-11 LAB
BASOPHILS # BLD AUTO: 0.05 K/UL (ref 0–0.2)
BASOPHILS NFR BLD: 1.2 % (ref 0–1.9)
DIFFERENTIAL METHOD: ABNORMAL
EOSINOPHIL # BLD AUTO: 0.2 K/UL (ref 0–0.5)
EOSINOPHIL NFR BLD: 3.7 % (ref 0–8)
ERYTHROCYTE [DISTWIDTH] IN BLOOD BY AUTOMATED COUNT: 13.8 % (ref 11.5–14.5)
HCT VFR BLD AUTO: 44.6 % (ref 40–54)
HGB BLD-MCNC: 15.6 G/DL (ref 14–18)
IGA SERPL-MCNC: 194 MG/DL (ref 40–350)
IGE SERPL-ACNC: 150 IU/ML (ref 0–100)
IGG SERPL-MCNC: 1211 MG/DL (ref 650–1600)
IGM SERPL-MCNC: 59 MG/DL (ref 50–300)
LYMPHOCYTES # BLD AUTO: 2.1 K/UL (ref 1–4.8)
LYMPHOCYTES NFR BLD: 48.4 % (ref 18–48)
MCH RBC QN AUTO: 31.7 PG (ref 27–31)
MCHC RBC AUTO-ENTMCNC: 35 G/DL (ref 32–36)
MCV RBC AUTO: 91 FL (ref 82–98)
MONOCYTES # BLD AUTO: 0.5 K/UL (ref 0.3–1)
MONOCYTES NFR BLD: 10.4 % (ref 4–15)
NEUTROPHILS # BLD AUTO: 1.6 K/UL (ref 1.8–7.7)
NEUTROPHILS NFR BLD: 36.3 % (ref 38–73)
PLATELET # BLD AUTO: 225 K/UL (ref 150–350)
PMV BLD AUTO: 8.6 FL (ref 9.2–12.9)
RBC # BLD AUTO: 4.92 M/UL (ref 4.6–6.2)
WBC # BLD AUTO: 4.32 K/UL (ref 3.9–12.7)

## 2019-09-11 PROCEDURE — 86003 ALLG SPEC IGE CRUDE XTRC EA: CPT

## 2019-09-11 PROCEDURE — 85025 COMPLETE CBC W/AUTO DIFF WBC: CPT

## 2019-09-11 PROCEDURE — 99999 PR PBB SHADOW E&M-EST. PATIENT-LVL III: ICD-10-PCS | Mod: PBBFAC,,, | Performed by: ALLERGY & IMMUNOLOGY

## 2019-09-11 PROCEDURE — 99999 PR PBB SHADOW E&M-EST. PATIENT-LVL III: CPT | Mod: PBBFAC,,, | Performed by: ALLERGY & IMMUNOLOGY

## 2019-09-11 PROCEDURE — 82784 ASSAY IGA/IGD/IGG/IGM EACH: CPT

## 2019-09-11 PROCEDURE — 86003 ALLG SPEC IGE CRUDE XTRC EA: CPT | Mod: 59

## 2019-09-11 PROCEDURE — 82785 ASSAY OF IGE: CPT

## 2019-09-11 PROCEDURE — 86317 IMMUNOASSAY INFECTIOUS AGENT: CPT

## 2019-09-11 PROCEDURE — 99244 OFF/OP CNSLTJ NEW/EST MOD 40: CPT | Mod: S$GLB,,, | Performed by: ALLERGY & IMMUNOLOGY

## 2019-09-11 PROCEDURE — 99244 PR OFFICE CONSULTATION,LEVEL IV: ICD-10-PCS | Mod: S$GLB,,, | Performed by: ALLERGY & IMMUNOLOGY

## 2019-09-11 NOTE — LETTER
September 11, 2019      Felipe Nieves MD  [address]  [facility] [address]           [facility] All[facility]  [address]  [facility] [address]  Phone: [phone]  Fax: [phone]          Patient: Kendell Harley   MR Number: [id number]   YOB: 1995   Date of Visit: 9/11/2019       Dear Dr. Felipe Nieves:    Thank you for referring Kendell Harley to me for evaluation. Attached you will find relevant portions of my assessment and plan of care.    If you have questions, please do not hesitate to call me. I look forward to following Kendell Harley along with you.    Sincerely,    Landon Kelley MD    Enclosure  CC:  No Recipients    If you would like to receive this communication electronically, please contact [email or web address] or [phone] to request more information on [employer] Link access.    For providers and/or their staff who would like to refer a patient to [facility], please contact us through our one-stop-shop provider referral line, [facility], at [phone].    If you feel you have received this communication in error or would no longer like to receive these types of communications, please e-mail [email or web address]

## 2019-09-11 NOTE — PROGRESS NOTES
Subjective:       Patient ID: Kendell Harley is a 24 y.o. male.    Chief Complaint:  Allergies and Sinus Problem      HPI:   Patient's symptoms include clear rhinorrhea, cough, headaches, itchy eyes, nasal congestion, postnasal drip, pressure sensation in ears, sneezing and watery eyes. Also recurrent halitosis. These symptoms are perennial with seasonal exacerbation. Significantly worse in summer. Current triggers include exposure to pollens. Also reports worse sx's when it's dry outside. The patient has been suffering from these symptoms for approximately 6 years. Sx's [place] in [place] [employer] [place] return to  about 3 years ago. The patient has tried astelin, nasacort, flonase, xyzal, zyrtec with inadequate relief of symptoms. Can't tell than any of them are helpful at all.  Immunotherapy has never been tried. The patient has never had nasal polyps. The patient has no history of asthma. The patient has a history of eczema. as child. Does not take abx for sinusitis frequently. Gets steroids shots almost every summer. Hx pneumonia in HS.  The patient has not had sinus surgery in the past.   [place] in [employer] [place]  Back in  x 3 years  Worse when dry outside    Environmental History: Pets in the home: none.  [place]: hardwood floors  Tobacco Smoke in Home: no    Past Medical History:   Diagnosis Date    Mononucleosis     diagnosed 9/20/12         Family History   Problem Relation Age of Onset    Diabetes Mother     Hypertension Mother     Hypertension Father     Diabetes Maternal Grandmother     Hypertension Maternal Grandmother     Cancer Maternal Grandmother     Hypertension Paternal Grandmother     Sudden death Neg Hx     Congenital heart disease Neg Hx    parents w asthma      Review of Systems   Constitutional: Negative for activity change, fatigue and fever.   HENT: Positive for congestion, postnasal drip and rhinorrhea. Negative for sinus pressure and sneezing.    Eyes:  Negative for discharge, redness and itching.   Respiratory: Negative for cough, shortness of breath and wheezing.    Cardiovascular: Negative for chest pain.   Gastrointestinal: Negative for constipation, diarrhea, nausea and vomiting.   Genitourinary: Negative for difficulty urinating.   Musculoskeletal: Negative for joint swelling and myalgias.   Skin: Negative for rash.   Neurological: Negative for headaches.   Hematological: Does not bruise/bleed easily.   Psychiatric/Behavioral: Negative for behavioral problems and sleep disturbance.          Objective:   Physical Exam   Constitutional: He is oriented to person, place, and time. He appears well-developed and well-nourished. No distress.   HENT:   Head: Normocephalic and atraumatic.   Right Ear: Tympanic membrane and external ear normal.   Left Ear: Tympanic membrane and external ear normal.   Nose: Nose normal.   Mouth/Throat: Oropharynx is clear and moist. No oropharyngeal exudate.   Eyes: Conjunctivae are normal. Right eye exhibits no discharge. Left eye exhibits no discharge.   Neck: Normal range of motion. Neck supple. No thyromegaly present.   Cardiovascular: Normal rate and regular rhythm.   Pulmonary/Chest: Effort normal and breath sounds normal. No respiratory distress. He has no wheezes.   Abdominal: Soft. Bowel sounds are normal. He exhibits no distension. There is no tenderness.   Musculoskeletal: Normal range of motion. He exhibits no edema.   Lymphadenopathy:     He has no cervical adenopathy.   Neurological: He is alert and oriented to person, place, and time. He exhibits normal muscle tone.   Skin: Skin is warm. No rash noted. He is not diaphoretic. No erythema.   Psychiatric: He has a normal mood and affect. His behavior is normal. Judgment and thought content normal.           Assessment:       1. Chronic rhinitis         Plan:       Kendell STEPHENS was seen today for allergies and sinus problem.    Diagnoses and all orders for this visit:    Chronic  rhinitis  -     Immunoglobulins (IgG, IgA, IgM) Quantitative; Future  -     IgE; Future  -     S.pneumoniae IgG Serotypes; Future  -     CBC auto differential; Future  -     Cat epithelium IgE; Future  -     Dog dander IgE; Future  -     D. farinae IgE; Future  -     D. pteronyssinus IgE; Future  -     Aspergillus fumagatus IgE; Future  -     Allergen-Alternaria Alternata; Future  -     Cockroach, American IgE; Future  -     Bahia grass IgE; Future  -     Burt IgE; Future  -     Oak, white IgE; Future  -     Plantain, English IgE; Future  -     Marsh elder, rough IgE; Future  -     Ragweed, short, common IgE; Future  -     Allergen, Pecan Tree IgE; Future  -     Allergen-Barton; Future    liberal use nasal saline rinses  Nasacort 2 sen twice daily  Defer adding further meds pending lab results

## 2019-09-13 LAB
A ALTERNATA IGE QN: 0.46 KU/L
A FUMIGATUS IGE QN: <0.35 KU/L
BAHIA GRASS IGE QN: 3.47 KU/L
CAT DANDER IGE QN: <0.35 KU/L
CEDAR IGE QN: 2.43 KU/L
COMMON RAGWEED IGE QN: 3.52 KU/L
D FARINAE IGE QN: 1.48 KU/L
D PTERONYSS IGE QN: 0.52 KU/L
DEPRECATED A ALTERNATA IGE RAST QL: ABNORMAL
DEPRECATED A FUMIGATUS IGE RAST QL: NORMAL
DEPRECATED BAHIA GRASS IGE RAST QL: ABNORMAL
DEPRECATED CAT DANDER IGE RAST QL: NORMAL
DEPRECATED CEDAR IGE RAST QL: ABNORMAL
DEPRECATED COMMON RAGWEED IGE RAST QL: ABNORMAL
DEPRECATED D FARINAE IGE RAST QL: ABNORMAL
DEPRECATED D PTERONYSS IGE RAST QL: ABNORMAL
DEPRECATED DOG DANDER IGE RAST QL: ABNORMAL
DEPRECATED ELDER IGE RAST QL: ABNORMAL
DEPRECATED ENGL PLANTAIN IGE RAST QL: ABNORMAL
DEPRECATED PECAN/HICK TREE IGE RAST QL: ABNORMAL
DEPRECATED ROACH IGE RAST QL: ABNORMAL
DEPRECATED TIMOTHY IGE RAST QL: ABNORMAL
DEPRECATED WHITE OAK IGE RAST QL: ABNORMAL
DOG DANDER IGE QN: 1.64 KU/L
ELDER IGE QN: 3.53 KU/L
ENGL PLANTAIN IGE QN: 3.4 KU/L
PECAN/HICK TREE IGE QN: 3.28 KU/L
ROACH IGE QN: 2.79 KU/L
TIMOTHY IGE QN: 3.27 KU/L
WHITE OAK IGE QN: 3.24 KU/L

## 2019-09-16 LAB
DEPRECATED S PNEUM 1 IGG SER-MCNC: <0.3 MCG/ML
DEPRECATED S PNEUM12 IGG SER-MCNC: <0.3 MCG/ML
DEPRECATED S PNEUM14 IGG SER-MCNC: <0.3 MCG/ML
DEPRECATED S PNEUM19 IGG SER-MCNC: 0.4 MCG/ML
DEPRECATED S PNEUM23 IGG SER-MCNC: <0.3 MCG/ML
DEPRECATED S PNEUM3 IGG SER-MCNC: 1 MCG/ML
DEPRECATED S PNEUM4 IGG SER-MCNC: <0.3 MCG/ML
DEPRECATED S PNEUM5 IGG SER-MCNC: 0.3 MCG/ML
DEPRECATED S PNEUM8 IGG SER-MCNC: 1.4 MCG/ML
DEPRECATED S PNEUM9 IGG SER-MCNC: <0.3 MCG/ML
S PNEUM DA 18C IGG SER-MCNC: <0.3 MCG/ML
S PNEUM DA 6B IGG SER-MCNC: <0.3 MCG/ML
S PNEUM DA 7F IGG SER-MCNC: 1.2 MCG/ML
S PNEUM DA 9V IGG SER-MCNC: <0.3 MCG/ML

## 2019-09-17 ENCOUNTER — TELEPHONE (OUTPATIENT)
Dept: ALLERGY | Facility: CLINIC | Age: 24
End: 2019-09-17

## 2019-09-17 NOTE — TELEPHONE ENCOUNTER
Called and left a message for the patient to give us a call back in regards to relaying lab results and scheduling a follow up appointment per Dr. Kelley.

## 2019-09-23 ENCOUNTER — PATIENT MESSAGE (OUTPATIENT)
Dept: ALLERGY | Facility: CLINIC | Age: 24
End: 2019-09-23

## 2019-09-24 ENCOUNTER — PATIENT OUTREACH (OUTPATIENT)
Dept: ADMINISTRATIVE | Facility: OTHER | Age: 24
End: 2019-09-24

## 2020-03-26 DIAGNOSIS — J30.1 SEASONAL ALLERGIC RHINITIS DUE TO POLLEN: ICD-10-CM

## 2020-03-26 RX ORDER — BENZONATATE 200 MG/1
200 CAPSULE ORAL 3 TIMES DAILY PRN
Qty: 60 CAPSULE | Refills: 2 | Status: SHIPPED | OUTPATIENT
Start: 2020-03-26 | End: 2020-05-02 | Stop reason: SDUPTHER

## 2020-03-26 NOTE — TELEPHONE ENCOUNTER
Patient was seen recently and is requesting a refill for Tessalon perles for his coughing.  If the Tessalon Perles do not help, he will call back to set up a virtual appointment.

## 2020-03-30 ENCOUNTER — PATIENT MESSAGE (OUTPATIENT)
Dept: INTERNAL MEDICINE | Facility: CLINIC | Age: 25
End: 2020-03-30

## 2020-03-30 DIAGNOSIS — J30.1 SEASONAL ALLERGIC RHINITIS DUE TO POLLEN: Primary | ICD-10-CM

## 2020-03-30 RX ORDER — LORATADINE 10 MG/1
10 TABLET ORAL DAILY
Qty: 30 TABLET | Refills: 11 | Status: SHIPPED | OUTPATIENT
Start: 2020-03-30 | End: 2020-04-03

## 2020-03-30 RX ORDER — MONTELUKAST SODIUM 10 MG/1
10 TABLET ORAL NIGHTLY
Qty: 30 TABLET | Refills: 0 | Status: SHIPPED | OUTPATIENT
Start: 2020-03-30 | End: 2020-04-29

## 2020-04-02 ENCOUNTER — PATIENT MESSAGE (OUTPATIENT)
Dept: ALLERGY | Facility: CLINIC | Age: 25
End: 2020-04-02

## 2020-04-03 ENCOUNTER — OFFICE VISIT (OUTPATIENT)
Dept: ALLERGY | Facility: CLINIC | Age: 25
End: 2020-04-03
Payer: OTHER GOVERNMENT

## 2020-04-03 ENCOUNTER — PATIENT MESSAGE (OUTPATIENT)
Dept: ALLERGY | Facility: CLINIC | Age: 25
End: 2020-04-03

## 2020-04-03 DIAGNOSIS — R76.0 ABNORMAL ANTIBODY TITER: Primary | ICD-10-CM

## 2020-04-03 DIAGNOSIS — J30.1 SEASONAL ALLERGIC RHINITIS DUE TO POLLEN: ICD-10-CM

## 2020-04-03 PROCEDURE — 99213 OFFICE O/P EST LOW 20 MIN: CPT | Mod: 95,,, | Performed by: STUDENT IN AN ORGANIZED HEALTH CARE EDUCATION/TRAINING PROGRAM

## 2020-04-03 PROCEDURE — 99213 PR OFFICE/OUTPT VISIT, EST, LEVL III, 20-29 MIN: ICD-10-PCS | Mod: 95,,, | Performed by: STUDENT IN AN ORGANIZED HEALTH CARE EDUCATION/TRAINING PROGRAM

## 2020-04-03 RX ORDER — METHYLPREDNISOLONE 4 MG/1
TABLET ORAL
Qty: 1 PACKAGE | Refills: 0 | Status: SHIPPED | OUTPATIENT
Start: 2020-04-03 | End: 2020-04-24

## 2020-04-03 RX ORDER — LEVOCETIRIZINE DIHYDROCHLORIDE 5 MG/1
5 TABLET, FILM COATED ORAL NIGHTLY
COMMUNITY
End: 2020-04-03

## 2020-04-03 RX ORDER — LEVOCETIRIZINE DIHYDROCHLORIDE 5 MG/1
TABLET, FILM COATED ORAL
Qty: 90 TABLET | Refills: 3 | Status: SHIPPED | OUTPATIENT
Start: 2020-04-03 | End: 2021-09-28 | Stop reason: SDUPTHER

## 2020-04-03 NOTE — LETTER
April 3, 2020      Enrike Ceja MD  [address]  [place] [address]           [facility] All[facility]  [address] [facility]  [place] [address]  Phone: [phone]          Patient: Kendell Harley   MR Number: [id number]   YOB: 1995   Date of Visit: 4/3/2020       Dear Dr. Enrike Ceja:    Thank you for referring Kendell Harley to me for evaluation. Attached you will find relevant portions of my assessment and plan of care.    If you have questions, please do not hesitate to call me. I look forward to following Kendell Harley along with you.    Sincerely,    Erin Leonardo MD    Enclosure  CC:  No Recipients    If you would like to receive this communication electronically, please contact [email or web address] or [phone] to request more information on [employer] Link access.    For providers and/or their staff who would like to refer a patient to [facility], please contact us through our one-stop-shop provider referral line, [facility], at [phone].    If you feel you have received this communication in error or would no longer like to receive these types of communications, please e-mail [email or web address]

## 2020-04-03 NOTE — PATIENT INSTRUCTIONS
Increase Xyzal to 2 tablets a day    Take steroid Dosepak as directed    Contact us if no improvement              At her convenience, get blood drawn at any [facility] to make sure your system responded to the pneumonia vaccine.

## 2020-04-03 NOTE — PROGRESS NOTES
Subjective:       Patient ID: Kendell Harley is a 24 y.o. male.    The patient location is:  [place],   The chief complaint leading to consultation is:  Allergy flare up  Visit type: Virtual visit with synchronous audio and video  Total time spent with patient:  Approximately 10 min  Each patient to whom he or she provides medical services by telemedicine is:  (1) informed of the relationship between the physician and patient and the respective role of any other health care provider with respect to management of the patient; and (2) notified that he or she may decline to receive medical services by telemedicine and may withdraw from such care at any time.          Chief Complaint:  Allergies (runny nose, itchy eyes) and Cough (scrathy throat)      HPI:   24-year-old male with allergic rhinitis complains of seasonal worsening for several days.    Related medications  Xyzal 1 tablet daily  BentonatePerles as needed  Azelastine nasal spray 2 squirts each nostril daily.    Nasacort--not using  Singulair ?    His main symptom is runny nose.  Other symptoms include sneezing, itchy eyes, and cough the level of his throat.  He denies significant nasal congestion.  Associated symptoms include loss of sense of taste and loss of sense of smell.  He denies any fever, chills, or purulence mucus.  He also denies any chest symptoms.  He has not been adequately helped by meds as above.    Since last visit he has had a Pneumovax.    No new problems or complaints      At his initial visit patient reported that his symptoms include clear rhinorrhea, cough, headaches, itchy eyes, nasal congestion, postnasal drip, pressure sensation in ears, sneezing and watery eyes. Also recurrent halitosis. These symptoms are perennial with seasonal exacerbation. Significantly worse in summer. Current triggers include exposure to pollens. Also reports worse sx's when it's dry outside. The patient has been suffering from these symptoms for  approximately 6 years. [place]x's [place] [employer] [place] to  about 3 years ago. The patient has tried astelin, nasacort, flonase, xyzal, zyrtec with inadequate relief of symptoms. Can't tell than any of them are helpful at all.  Immunotherapy has never been tried. The patient has never had nasal polyps. The patient has no history of asthma. The patient has a history of eczema. as child. Does not take abx for sinusitis frequently. Gets steroids shots almost every summer. Hx pneumonia in HS.  The patient has not had sinus surgery in the past.   [place] [employer] [place]  [place]  x 3 years  Worse when dry outside    Environmental History: Pets in the home: none.  [place]: hardwood floors  Tobacco Smoke in Home: no    Past Medical History:   Diagnosis Date    Allergy     Mononucleosis     diagnosed 9/20/12    Recurrent upper respiratory infection (URI)          Family History   Problem Relation Age of Onset    Diabetes Mother     Hypertension Mother     Hypertension Father     Diabetes Maternal Grandmother     Hypertension Maternal Grandmother     Cancer Maternal Grandmother     Hypertension Paternal Grandmother     Sudden death Neg Hx     Congenital heart disease Neg Hx    parents w asthma      Review of Systems   Constitutional: Negative for activity change, appetite change, chills and fever.   HENT: Positive for rhinorrhea and sneezing.    Eyes: Positive for itching.   Respiratory: Positive for cough. Negative for chest tightness and shortness of breath.    Cardiovascular: Negative for chest pain and palpitations.   Gastrointestinal: Negative for blood in stool and vomiting.   Genitourinary: Negative for dysuria and hematuria.   Musculoskeletal: Negative for arthralgias and myalgias.   Allergic/Immunologic: Positive for environmental allergies. Negative for immunocompromised state.          Objective:   Physical Exam   Constitutional: He is oriented to person, place, and  time. He appears well-developed and well-nourished. No distress.   HENT:   Head: Normocephalic and atraumatic.   Right Ear: Tympanic membrane normal.   Left Ear: Tympanic membrane normal.   Nose: Nose normal.   Mouth/Throat: No oropharyngeal exudate.   Eyes: Conjunctivae are normal. Right eye exhibits no discharge. Left eye exhibits no discharge.   Neck: Neck supple.   No stridor   Pulmonary/Chest: Effort normal. No respiratory distress.   Musculoskeletal: Normal range of motion. He exhibits no edema.   Neurological: He is alert and oriented to person, place, and time.   Skin: Skin is warm. No rash noted. He is not diaphoretic. No erythema.   Psychiatric: He has a normal mood and affect. His behavior is normal. Judgment and thought content normal.           Assessment:       1. Abnormal antibody titer    2. Seasonal allergic rhinitis due to pollen         Plan:       Kendell STEPHENS was seen today for allergies and cough.    Diagnoses and all orders for this visit:        Seasonal allergic rhinitis due to pollen  -     Ambulatory referral/consult to Allergy  -     levocetirizine (XYZAL) 5 MG tablet; Take 2 tablets in the morning.  -     methylPREDNISolone (MEDROL DOSEPACK) 4 mg tablet; use as directed    Abnormal antibody titer  -     S.pneumoniae IgG Serotypes; Future

## 2020-05-02 DIAGNOSIS — J30.1 SEASONAL ALLERGIC RHINITIS DUE TO POLLEN: ICD-10-CM

## 2020-05-04 RX ORDER — BENZONATATE 200 MG/1
200 CAPSULE ORAL 3 TIMES DAILY PRN
Qty: 60 CAPSULE | Refills: 2 | Status: SHIPPED | OUTPATIENT
Start: 2020-05-04 | End: 2020-10-16 | Stop reason: SDUPTHER

## 2020-05-08 DIAGNOSIS — J30.1 SEASONAL ALLERGIC RHINITIS DUE TO POLLEN: ICD-10-CM

## 2020-05-08 RX ORDER — AZELASTINE 1 MG/ML
1 SPRAY, METERED NASAL 2 TIMES DAILY
Qty: 30 ML | Refills: 2 | Status: SHIPPED | OUTPATIENT
Start: 2020-05-08 | End: 2020-08-31

## 2020-05-08 RX ORDER — TRIAMCINOLONE ACETONIDE 55 UG/1
2 SPRAY, METERED NASAL DAILY
Qty: 16.9 ML | Refills: 2 | Status: SHIPPED | OUTPATIENT
Start: 2020-05-08 | End: 2021-03-19

## 2020-05-08 RX ORDER — TRIAMCINOLONE ACETONIDE 55 UG/1
2 SPRAY, METERED NASAL DAILY
Refills: 0 | Status: CANCELLED | COMMUNITY
Start: 2020-05-08

## 2020-10-05 ENCOUNTER — PATIENT MESSAGE (OUTPATIENT)
Dept: ADMINISTRATIVE | Facility: HOSPITAL | Age: 25
End: 2020-10-05

## 2020-10-16 ENCOUNTER — IMMUNIZATION (OUTPATIENT)
Dept: PHARMACY | Facility: CLINIC | Age: 25
End: 2020-10-16
Payer: COMMERCIAL

## 2020-10-16 ENCOUNTER — IMMUNIZATION (OUTPATIENT)
Dept: PHARMACY | Facility: CLINIC | Age: 25
End: 2020-10-16

## 2020-10-16 ENCOUNTER — OFFICE VISIT (OUTPATIENT)
Dept: INTERNAL MEDICINE | Facility: CLINIC | Age: 25
End: 2020-10-16
Payer: COMMERCIAL

## 2020-10-16 VITALS
OXYGEN SATURATION: 99 % | HEIGHT: 75 IN | TEMPERATURE: 98 F | BODY MASS INDEX: 21.22 KG/M2 | HEART RATE: 72 BPM | DIASTOLIC BLOOD PRESSURE: 80 MMHG | SYSTOLIC BLOOD PRESSURE: 108 MMHG | WEIGHT: 170.63 LBS

## 2020-10-16 DIAGNOSIS — Z11.59 NEED FOR HEPATITIS C SCREENING TEST: ICD-10-CM

## 2020-10-16 DIAGNOSIS — Z00.00 ANNUAL PHYSICAL EXAM: ICD-10-CM

## 2020-10-16 DIAGNOSIS — R07.9 RIGHT-SIDED CHEST PAIN: ICD-10-CM

## 2020-10-16 DIAGNOSIS — Z11.4 SCREENING FOR HIV (HUMAN IMMUNODEFICIENCY VIRUS): ICD-10-CM

## 2020-10-16 DIAGNOSIS — J30.1 SEASONAL ALLERGIC RHINITIS DUE TO POLLEN: ICD-10-CM

## 2020-10-16 PROCEDURE — 99999 PR PBB SHADOW E&M-EST. PATIENT-LVL III: ICD-10-PCS | Mod: PBBFAC,,, | Performed by: FAMILY MEDICINE

## 2020-10-16 PROCEDURE — 99395 PREV VISIT EST AGE 18-39: CPT | Mod: S$GLB,,, | Performed by: FAMILY MEDICINE

## 2020-10-16 PROCEDURE — 99395 PR PREVENTIVE VISIT,EST,18-39: ICD-10-PCS | Mod: S$GLB,,, | Performed by: FAMILY MEDICINE

## 2020-10-16 PROCEDURE — 99999 PR PBB SHADOW E&M-EST. PATIENT-LVL III: CPT | Mod: PBBFAC,,, | Performed by: FAMILY MEDICINE

## 2020-10-16 RX ORDER — BENZONATATE 200 MG/1
200 CAPSULE ORAL 3 TIMES DAILY PRN
Qty: 60 CAPSULE | Refills: 2 | Status: SHIPPED | OUTPATIENT
Start: 2020-10-16 | End: 2021-06-09 | Stop reason: SDUPTHER

## 2020-10-16 RX ORDER — MONTELUKAST SODIUM 10 MG/1
10 TABLET ORAL NIGHTLY
Qty: 30 TABLET | Refills: 0 | Status: SHIPPED | OUTPATIENT
Start: 2020-10-16 | End: 2020-11-09

## 2020-10-16 NOTE — PROGRESS NOTES
Subjective:      Patient ID: Kendell Harley is a 25 y.o. male.    Chief Complaint: Annual Exam      HPI:  Kendell Harley is a 25 year old male with allergic rhinitis, dyslipidemia, and history of palpitations who presents to clinic today for annual physical.    AR:  Prescribed azelastine nasal spray, Xyzal 5 mg 2 tabs by mouth daily, and Nasacort 55 mcg 2 sprays once daily.  Has been seen by allergy previously, most recently 4/3/20; given Medrol dosepack at that time.  Had allergy testing positive to dust mites, dog, grass, cockroach, common mold, tree and weed pollens.  Endorses persistent rhinorrhea, itchy throat, headaches.  Did allergy shots with some temporary improvement.  Works outside.  Still on azelastine.  Ran out of benzonatate for associated cough.  States he cannot really see a different with current regimen.      States he has been having a sharp right sided chest pain over the past 2 weeks, intermittent issue, worse with exertion.   States he has a history of an irregular heart beat in 2013--EKG from that time showed sinus rhythm with PVCs.  Denies chest pain currently.    Dyslipidemia:  HDL 77, LDL 59.8 on most recent lipid panel 2/23/19.    Health Care Maintenance:  Influenza vaccination:  Amenable  Last tetanus booster:  11/5/13  HIV screening:  Amenable  Hepatitis C screening:  Amenable  HPV vaccination:  10/25/12, 7/26/13      Past Medical History:   Diagnosis Date    Allergy     Mononucleosis     diagnosed 9/20/12    Recurrent upper respiratory infection (URI)        History reviewed. No pertinent surgical history.    Family History   Problem Relation Age of Onset    Diabetes Mother     Hypertension Mother     Hypertension Father     Diabetes Maternal Grandmother     Hypertension Maternal Grandmother     Cancer Maternal Grandmother     Hypertension Paternal Grandmother     Sudden death Neg Hx     Congenital heart disease Neg Hx        Social History     Socioeconomic History     Marital status: Single     Spouse name: Not on file    Number of children: Not on file    Years of education: Not on file    Highest education level: Not on file   Occupational History    Not on file   Social Needs    Financial resource strain: Not on file    Food insecurity     Worry: Not on file     Inability: Not on file    Transportation needs     Medical: Not on file     Non-medical: Not on file   Tobacco Use    Smoking status: Passive Smoke Exposure - Never Smoker    Smokeless tobacco: Never Used    Tobacco comment: father smokes   Substance and Sexual Activity    Alcohol use: Yes     Frequency: 2-4 times a month     Drinks per session: 3 or 4    Drug use: No    Sexual activity: Yes     Partners: Female     Birth control/protection: Condom   Lifestyle    Physical activity     Days per week: Not on file     Minutes per session: Not on file    Stress: Not on file   Relationships    Social connections     Talks on phone: Not on file     Gets together: Not on file     Attends Orthodox service: Not on file     Active member of club or organization: Not on file     Attends meetings of clubs or organizations: Not on file     Relationship status: Not on file   Other Topics Concern    Not on file   Social History Narrative    Not on file       Review of Systems   Constitutional: Negative for chills, fatigue and fever.   HENT: Positive for rhinorrhea. Negative for congestion, hearing loss, nosebleeds, sore throat and trouble swallowing.    Eyes: Negative for pain and visual disturbance.   Respiratory: Positive for cough. Negative for shortness of breath and wheezing.    Cardiovascular: Negative for palpitations.        + Recent right sided chest pain   Gastrointestinal: Negative for abdominal distention, abdominal pain, constipation, diarrhea, nausea and vomiting.   Genitourinary: Negative for difficulty urinating, dysuria, frequency, hematuria and urgency.   Musculoskeletal: Negative for arthralgias,  "back pain and myalgias.   Skin: Negative for color change and rash.   Allergic/Immunologic: Positive for environmental allergies.   Neurological: Positive for headaches. Negative for dizziness, syncope, speech difficulty, weakness and numbness.   Psychiatric/Behavioral: Negative for agitation, behavioral problems and confusion. The patient is not nervous/anxious.      Objective:     Vitals:    10/16/20 1348   BP: 108/80   BP Location: Left arm   Patient Position: Sitting   BP Method: Medium (Manual)   Pulse: 72   Temp: 98.3 °F (36.8 °C)   TempSrc: Oral   SpO2: 99%   Weight: 77.4 kg (170 lb 10.2 oz)   Height: 6' 3" (1.905 m)       Physical Exam  Vitals signs and nursing note reviewed.   Constitutional:       General: He is not in acute distress.     Appearance: He is well-developed.   HENT:      Head: Normocephalic and atraumatic.      Right Ear: Hearing, tympanic membrane, ear canal and external ear normal.      Left Ear: Hearing, tympanic membrane, ear canal and external ear normal.      Nose: Nose normal. No rhinorrhea.      Mouth/Throat:      Mouth: No oral lesions.      Pharynx: No pharyngeal swelling, oropharyngeal exudate or posterior oropharyngeal erythema.   Eyes:      General: Lids are normal.         Right eye: No discharge.         Left eye: No discharge.      Conjunctiva/sclera: Conjunctivae normal.   Neck:      Musculoskeletal: Neck supple.      Trachea: Trachea normal. No tracheal deviation.   Cardiovascular:      Rate and Rhythm: Normal rate and regular rhythm.      Heart sounds: Normal heart sounds. No murmur. No friction rub. No gallop.    Pulmonary:      Effort: Pulmonary effort is normal. No respiratory distress.      Breath sounds: Normal breath sounds. No wheezing or rales.   Abdominal:      General: Bowel sounds are normal. There is no distension.      Palpations: Abdomen is soft.      Tenderness: There is no abdominal tenderness. There is no guarding or rebound.   Musculoskeletal:         " General: No deformity.   Lymphadenopathy:      Cervical: No cervical adenopathy.   Skin:     General: Skin is warm and dry.      Findings: No rash.   Neurological:      Mental Status: He is alert.      Motor: No abnormal muscle tone.   Psychiatric:         Speech: Speech normal.         Behavior: Behavior normal. Behavior is cooperative.         Thought Content: Thought content normal.         Judgment: Judgment normal.        Assessment:      1. Annual physical exam    2. Need for hepatitis C screening test    3. Right-sided chest pain    4. Screening for HIV (human immunodeficiency virus)    5. Seasonal allergic rhinitis due to pollen      Plan:   Kendell STEPHENS was seen today for annual exam.    Diagnoses and all orders for this visit:    Annual physical exam  -     Lipid Panel; Future  -     Comprehensive Metabolic Panel; Future  -     CBC auto differential; Future  -     Vitamin D; Future  -     Vaccination card for 3rd dose of Gardasil provided    Need for hepatitis C screening test  -     Hepatitis C Antibody; Future    Right-sided chest pain  -     EKG 12-lead; Future; suspect costochondritis, can try OTC anti-inflammatories PRN; RTC if worsening or no improvement.    Screening for HIV (human immunodeficiency virus)  -     HIV 1/2 Ag/Ab (4th Gen); Future    Seasonal allergic rhinitis due to pollen  -     Start montelukast (SINGULAIR) 10 mg tablet; Take 1 tablet (10 mg total) by mouth every evening.  Counseled patient on potential adverse effects; instructed him to discontinue if any occur and notify me.  -     Continue benzonatate (TESSALON) 200 MG capsule; Take 1 capsule (200 mg total) by mouth 3 (three) times daily as needed for Cough, refilled.  Continue Xyzal, azelastine, and Nasacort.  F/u with allergy if no improvement with above.

## 2020-10-17 ENCOUNTER — LAB VISIT (OUTPATIENT)
Dept: LAB | Facility: HOSPITAL | Age: 25
End: 2020-10-17
Payer: COMMERCIAL

## 2020-10-17 DIAGNOSIS — Z00.00 ANNUAL PHYSICAL EXAM: ICD-10-CM

## 2020-10-17 DIAGNOSIS — Z11.59 NEED FOR HEPATITIS C SCREENING TEST: ICD-10-CM

## 2020-10-17 DIAGNOSIS — Z11.4 SCREENING FOR HIV (HUMAN IMMUNODEFICIENCY VIRUS): ICD-10-CM

## 2020-10-17 LAB
25(OH)D3+25(OH)D2 SERPL-MCNC: 35 NG/ML (ref 30–96)
ALBUMIN SERPL BCP-MCNC: 3.9 G/DL (ref 3.5–5.2)
ALP SERPL-CCNC: 67 U/L (ref 55–135)
ALT SERPL W/O P-5'-P-CCNC: 12 U/L (ref 10–44)
ANION GAP SERPL CALC-SCNC: 8 MMOL/L (ref 8–16)
AST SERPL-CCNC: 19 U/L (ref 10–40)
BASOPHILS # BLD AUTO: 0.06 K/UL (ref 0–0.2)
BASOPHILS NFR BLD: 1.3 % (ref 0–1.9)
BILIRUB SERPL-MCNC: 0.4 MG/DL (ref 0.1–1)
BUN SERPL-MCNC: 7 MG/DL (ref 6–20)
CALCIUM SERPL-MCNC: 9.3 MG/DL (ref 8.7–10.5)
CHLORIDE SERPL-SCNC: 104 MMOL/L (ref 95–110)
CHOLEST SERPL-MCNC: 135 MG/DL (ref 120–199)
CHOLEST/HDLC SERPL: 2.3 {RATIO} (ref 2–5)
CO2 SERPL-SCNC: 28 MMOL/L (ref 23–29)
CREAT SERPL-MCNC: 1 MG/DL (ref 0.5–1.4)
DIFFERENTIAL METHOD: ABNORMAL
EOSINOPHIL # BLD AUTO: 0.2 K/UL (ref 0–0.5)
EOSINOPHIL NFR BLD: 5.1 % (ref 0–8)
ERYTHROCYTE [DISTWIDTH] IN BLOOD BY AUTOMATED COUNT: 13.9 % (ref 11.5–14.5)
EST. GFR  (AFRICAN AMERICAN): >60 ML/MIN/1.73 M^2
EST. GFR  (NON AFRICAN AMERICAN): >60 ML/MIN/1.73 M^2
GLUCOSE SERPL-MCNC: 89 MG/DL (ref 70–110)
HCT VFR BLD AUTO: 45.5 % (ref 40–54)
HDLC SERPL-MCNC: 60 MG/DL (ref 40–75)
HDLC SERPL: 44.4 % (ref 20–50)
HGB BLD-MCNC: 15 G/DL (ref 14–18)
IMM GRANULOCYTES # BLD AUTO: 0.01 K/UL (ref 0–0.04)
IMM GRANULOCYTES NFR BLD AUTO: 0.2 % (ref 0–0.5)
LDLC SERPL CALC-MCNC: 64.8 MG/DL (ref 63–159)
LYMPHOCYTES # BLD AUTO: 1.5 K/UL (ref 1–4.8)
LYMPHOCYTES NFR BLD: 31.3 % (ref 18–48)
MCH RBC QN AUTO: 31.9 PG (ref 27–31)
MCHC RBC AUTO-ENTMCNC: 33 G/DL (ref 32–36)
MCV RBC AUTO: 97 FL (ref 82–98)
MONOCYTES # BLD AUTO: 0.8 K/UL (ref 0.3–1)
MONOCYTES NFR BLD: 16.7 % (ref 4–15)
NEUTROPHILS # BLD AUTO: 2.1 K/UL (ref 1.8–7.7)
NEUTROPHILS NFR BLD: 45.4 % (ref 38–73)
NONHDLC SERPL-MCNC: 75 MG/DL
NRBC BLD-RTO: 0 /100 WBC
PLATELET # BLD AUTO: 213 K/UL (ref 150–350)
PMV BLD AUTO: 9 FL (ref 9.2–12.9)
POTASSIUM SERPL-SCNC: 4.2 MMOL/L (ref 3.5–5.1)
PROT SERPL-MCNC: 7 G/DL (ref 6–8.4)
RBC # BLD AUTO: 4.7 M/UL (ref 4.6–6.2)
SODIUM SERPL-SCNC: 140 MMOL/L (ref 136–145)
TRIGL SERPL-MCNC: 51 MG/DL (ref 30–150)
WBC # BLD AUTO: 4.67 K/UL (ref 3.9–12.7)

## 2020-10-17 PROCEDURE — 80053 COMPREHEN METABOLIC PANEL: CPT

## 2020-10-17 PROCEDURE — 85025 COMPLETE CBC W/AUTO DIFF WBC: CPT

## 2020-10-17 PROCEDURE — 86703 HIV-1/HIV-2 1 RESULT ANTBDY: CPT

## 2020-10-17 PROCEDURE — 86803 HEPATITIS C AB TEST: CPT

## 2020-10-17 PROCEDURE — 80061 LIPID PANEL: CPT

## 2020-10-17 PROCEDURE — 82306 VITAMIN D 25 HYDROXY: CPT

## 2020-10-17 PROCEDURE — 36415 COLL VENOUS BLD VENIPUNCTURE: CPT

## 2020-10-19 LAB
HCV AB SERPL QL IA: NEGATIVE
HIV 1+2 AB+HIV1 P24 AG SERPL QL IA: NEGATIVE

## 2020-11-08 DIAGNOSIS — J30.1 SEASONAL ALLERGIC RHINITIS DUE TO POLLEN: ICD-10-CM

## 2020-11-09 RX ORDER — MONTELUKAST SODIUM 10 MG/1
TABLET ORAL
Qty: 30 TABLET | Refills: 11 | Status: SHIPPED | OUTPATIENT
Start: 2020-11-09 | End: 2021-03-19

## 2021-03-19 ENCOUNTER — OFFICE VISIT (OUTPATIENT)
Dept: INTERNAL MEDICINE | Facility: CLINIC | Age: 26
End: 2021-03-19
Payer: COMMERCIAL

## 2021-03-19 VITALS
HEIGHT: 75 IN | WEIGHT: 168.63 LBS | HEART RATE: 94 BPM | BODY MASS INDEX: 20.97 KG/M2 | DIASTOLIC BLOOD PRESSURE: 88 MMHG | OXYGEN SATURATION: 97 % | TEMPERATURE: 99 F | SYSTOLIC BLOOD PRESSURE: 118 MMHG

## 2021-03-19 DIAGNOSIS — L60.8 ACQUIRED DEFORMITY OF TOENAIL: ICD-10-CM

## 2021-03-19 DIAGNOSIS — L60.0 INGROWN TOENAIL OF LEFT FOOT: ICD-10-CM

## 2021-03-19 PROCEDURE — 99213 PR OFFICE/OUTPT VISIT, EST, LEVL III, 20-29 MIN: ICD-10-PCS | Mod: S$GLB,,, | Performed by: FAMILY MEDICINE

## 2021-03-19 PROCEDURE — 99999 PR PBB SHADOW E&M-EST. PATIENT-LVL IV: CPT | Mod: PBBFAC,,, | Performed by: FAMILY MEDICINE

## 2021-03-19 PROCEDURE — 99213 OFFICE O/P EST LOW 20 MIN: CPT | Mod: S$GLB,,, | Performed by: FAMILY MEDICINE

## 2021-03-19 PROCEDURE — 99999 PR PBB SHADOW E&M-EST. PATIENT-LVL IV: ICD-10-PCS | Mod: PBBFAC,,, | Performed by: FAMILY MEDICINE

## 2021-04-20 ENCOUNTER — PATIENT MESSAGE (OUTPATIENT)
Dept: INTERNAL MEDICINE | Facility: CLINIC | Age: 26
End: 2021-04-20

## 2021-04-27 ENCOUNTER — OFFICE VISIT (OUTPATIENT)
Dept: PODIATRY | Facility: CLINIC | Age: 26
End: 2021-04-27
Payer: COMMERCIAL

## 2021-04-27 VITALS
HEART RATE: 82 BPM | SYSTOLIC BLOOD PRESSURE: 133 MMHG | HEIGHT: 75 IN | BODY MASS INDEX: 21.6 KG/M2 | DIASTOLIC BLOOD PRESSURE: 81 MMHG | WEIGHT: 173.75 LBS

## 2021-04-27 DIAGNOSIS — Q82.8 POROKERATOSIS: Primary | ICD-10-CM

## 2021-04-27 DIAGNOSIS — M79.675 PAIN OF TOE OF LEFT FOOT: ICD-10-CM

## 2021-04-27 PROCEDURE — 99203 PR OFFICE/OUTPT VISIT, NEW, LEVL III, 30-44 MIN: ICD-10-PCS | Mod: S$GLB,,, | Performed by: PODIATRIST

## 2021-04-27 PROCEDURE — 99999 PR PBB SHADOW E&M-EST. PATIENT-LVL III: CPT | Mod: PBBFAC,,, | Performed by: PODIATRIST

## 2021-04-27 PROCEDURE — 99999 PR PBB SHADOW E&M-EST. PATIENT-LVL III: ICD-10-PCS | Mod: PBBFAC,,, | Performed by: PODIATRIST

## 2021-04-27 PROCEDURE — 99203 OFFICE O/P NEW LOW 30 MIN: CPT | Mod: S$GLB,,, | Performed by: PODIATRIST

## 2021-06-09 DIAGNOSIS — J30.1 SEASONAL ALLERGIC RHINITIS DUE TO POLLEN: ICD-10-CM

## 2021-06-09 RX ORDER — AZELASTINE 1 MG/ML
1 SPRAY, METERED NASAL 2 TIMES DAILY
Qty: 30 ML | Refills: 2 | Status: SHIPPED | OUTPATIENT
Start: 2021-06-09 | End: 2021-09-28 | Stop reason: SDUPTHER

## 2021-06-09 RX ORDER — BENZONATATE 200 MG/1
200 CAPSULE ORAL 3 TIMES DAILY PRN
Qty: 60 CAPSULE | Refills: 2 | Status: SHIPPED | OUTPATIENT
Start: 2021-06-09 | End: 2021-09-28

## 2021-06-10 ENCOUNTER — PATIENT MESSAGE (OUTPATIENT)
Dept: PODIATRY | Facility: CLINIC | Age: 26
End: 2021-06-10

## 2021-06-10 ENCOUNTER — TELEPHONE (OUTPATIENT)
Dept: PODIATRY | Facility: CLINIC | Age: 26
End: 2021-06-10

## 2021-07-01 ENCOUNTER — OFFICE VISIT (OUTPATIENT)
Dept: PODIATRY | Facility: CLINIC | Age: 26
End: 2021-07-01
Payer: COMMERCIAL

## 2021-07-01 VITALS
DIASTOLIC BLOOD PRESSURE: 75 MMHG | BODY MASS INDEX: 21.6 KG/M2 | WEIGHT: 173.75 LBS | SYSTOLIC BLOOD PRESSURE: 132 MMHG | HEART RATE: 76 BPM | HEIGHT: 75 IN

## 2021-07-01 DIAGNOSIS — M79.675 PAIN OF TOE OF LEFT FOOT: Primary | ICD-10-CM

## 2021-07-01 PROCEDURE — 99999 PR PBB SHADOW E&M-EST. PATIENT-LVL III: ICD-10-PCS | Mod: PBBFAC,,, | Performed by: PODIATRIST

## 2021-07-01 PROCEDURE — 99999 PR PBB SHADOW E&M-EST. PATIENT-LVL III: CPT | Mod: PBBFAC,,, | Performed by: PODIATRIST

## 2021-07-01 PROCEDURE — 99213 PR OFFICE/OUTPT VISIT, EST, LEVL III, 20-29 MIN: ICD-10-PCS | Mod: S$GLB,,, | Performed by: PODIATRIST

## 2021-07-01 PROCEDURE — 99213 OFFICE O/P EST LOW 20 MIN: CPT | Mod: S$GLB,,, | Performed by: PODIATRIST

## 2021-09-28 ENCOUNTER — LAB VISIT (OUTPATIENT)
Dept: LAB | Facility: HOSPITAL | Age: 26
End: 2021-09-28

## 2021-09-28 ENCOUNTER — OFFICE VISIT (OUTPATIENT)
Dept: INTERNAL MEDICINE | Facility: CLINIC | Age: 26
End: 2021-09-28

## 2021-09-28 VITALS
SYSTOLIC BLOOD PRESSURE: 118 MMHG | WEIGHT: 174.81 LBS | HEIGHT: 75 IN | TEMPERATURE: 99 F | HEART RATE: 72 BPM | BODY MASS INDEX: 21.73 KG/M2 | OXYGEN SATURATION: 98 % | DIASTOLIC BLOOD PRESSURE: 88 MMHG

## 2021-09-28 DIAGNOSIS — Z00.00 ANNUAL PHYSICAL EXAM: Primary | ICD-10-CM

## 2021-09-28 DIAGNOSIS — Z00.00 ANNUAL PHYSICAL EXAM: ICD-10-CM

## 2021-09-28 DIAGNOSIS — J30.1 SEASONAL ALLERGIC RHINITIS DUE TO POLLEN: ICD-10-CM

## 2021-09-28 DIAGNOSIS — R82.90 ABNORMAL URINE ODOR: ICD-10-CM

## 2021-09-28 LAB
25(OH)D3+25(OH)D2 SERPL-MCNC: 33 NG/ML (ref 30–96)
ALBUMIN SERPL BCP-MCNC: 4.2 G/DL (ref 3.5–5.2)
ALP SERPL-CCNC: 57 U/L (ref 55–135)
ALT SERPL W/O P-5'-P-CCNC: 17 U/L (ref 10–44)
ANION GAP SERPL CALC-SCNC: 9 MMOL/L (ref 8–16)
AST SERPL-CCNC: 19 U/L (ref 10–40)
BASOPHILS # BLD AUTO: 0.06 K/UL (ref 0–0.2)
BASOPHILS NFR BLD: 1.4 % (ref 0–1.9)
BILIRUB SERPL-MCNC: 0.6 MG/DL (ref 0.1–1)
BILIRUB UR QL STRIP: NEGATIVE
BUN SERPL-MCNC: 8 MG/DL (ref 6–20)
CALCIUM SERPL-MCNC: 10.1 MG/DL (ref 8.7–10.5)
CHLORIDE SERPL-SCNC: 104 MMOL/L (ref 95–110)
CHOLEST SERPL-MCNC: 159 MG/DL (ref 120–199)
CHOLEST/HDLC SERPL: 2.6 {RATIO} (ref 2–5)
CLARITY UR REFRACT.AUTO: CLEAR
CO2 SERPL-SCNC: 28 MMOL/L (ref 23–29)
COLOR UR AUTO: YELLOW
CREAT SERPL-MCNC: 1.2 MG/DL (ref 0.5–1.4)
DIFFERENTIAL METHOD: ABNORMAL
EOSINOPHIL # BLD AUTO: 0.3 K/UL (ref 0–0.5)
EOSINOPHIL NFR BLD: 6.4 % (ref 0–8)
ERYTHROCYTE [DISTWIDTH] IN BLOOD BY AUTOMATED COUNT: 13.4 % (ref 11.5–14.5)
EST. GFR  (AFRICAN AMERICAN): >60 ML/MIN/1.73 M^2
EST. GFR  (NON AFRICAN AMERICAN): >60 ML/MIN/1.73 M^2
GLUCOSE SERPL-MCNC: 105 MG/DL (ref 70–110)
GLUCOSE UR QL STRIP: NEGATIVE
HCT VFR BLD AUTO: 47.2 % (ref 40–54)
HDLC SERPL-MCNC: 62 MG/DL (ref 40–75)
HDLC SERPL: 39 % (ref 20–50)
HGB BLD-MCNC: 16.2 G/DL (ref 14–18)
HGB UR QL STRIP: NEGATIVE
IMM GRANULOCYTES # BLD AUTO: 0.01 K/UL (ref 0–0.04)
IMM GRANULOCYTES NFR BLD AUTO: 0.2 % (ref 0–0.5)
KETONES UR QL STRIP: NEGATIVE
LDLC SERPL CALC-MCNC: 83.6 MG/DL (ref 63–159)
LEUKOCYTE ESTERASE UR QL STRIP: NEGATIVE
LYMPHOCYTES # BLD AUTO: 1.8 K/UL (ref 1–4.8)
LYMPHOCYTES NFR BLD: 40.7 % (ref 18–48)
MCH RBC QN AUTO: 32.2 PG (ref 27–31)
MCHC RBC AUTO-ENTMCNC: 34.3 G/DL (ref 32–36)
MCV RBC AUTO: 94 FL (ref 82–98)
MONOCYTES # BLD AUTO: 0.4 K/UL (ref 0.3–1)
MONOCYTES NFR BLD: 8.7 % (ref 4–15)
NEUTROPHILS # BLD AUTO: 1.9 K/UL (ref 1.8–7.7)
NEUTROPHILS NFR BLD: 42.6 % (ref 38–73)
NITRITE UR QL STRIP: NEGATIVE
NONHDLC SERPL-MCNC: 97 MG/DL
NRBC BLD-RTO: 0 /100 WBC
PH UR STRIP: 6 [PH] (ref 5–8)
PLATELET # BLD AUTO: 224 K/UL (ref 150–450)
PMV BLD AUTO: 9.1 FL (ref 9.2–12.9)
POTASSIUM SERPL-SCNC: 4.3 MMOL/L (ref 3.5–5.1)
PROT SERPL-MCNC: 7.2 G/DL (ref 6–8.4)
PROT UR QL STRIP: NEGATIVE
RBC # BLD AUTO: 5.03 M/UL (ref 4.6–6.2)
SODIUM SERPL-SCNC: 141 MMOL/L (ref 136–145)
SP GR UR STRIP: 1.02 (ref 1–1.03)
TRIGL SERPL-MCNC: 67 MG/DL (ref 30–150)
URN SPEC COLLECT METH UR: NORMAL
WBC # BLD AUTO: 4.35 K/UL (ref 3.9–12.7)

## 2021-09-28 PROCEDURE — 99999 PR PBB SHADOW E&M-EST. PATIENT-LVL III: CPT | Mod: PBBFAC,,, | Performed by: FAMILY MEDICINE

## 2021-09-28 PROCEDURE — 99213 OFFICE O/P EST LOW 20 MIN: CPT | Mod: PBBFAC | Performed by: FAMILY MEDICINE

## 2021-09-28 PROCEDURE — 36415 COLL VENOUS BLD VENIPUNCTURE: CPT | Performed by: FAMILY MEDICINE

## 2021-09-28 PROCEDURE — 82306 VITAMIN D 25 HYDROXY: CPT | Performed by: FAMILY MEDICINE

## 2021-09-28 PROCEDURE — 80061 LIPID PANEL: CPT | Performed by: FAMILY MEDICINE

## 2021-09-28 PROCEDURE — 85025 COMPLETE CBC W/AUTO DIFF WBC: CPT | Performed by: FAMILY MEDICINE

## 2021-09-28 PROCEDURE — 81003 URINALYSIS AUTO W/O SCOPE: CPT | Performed by: FAMILY MEDICINE

## 2021-09-28 PROCEDURE — 99999 PR PBB SHADOW E&M-EST. PATIENT-LVL III: ICD-10-PCS | Mod: PBBFAC,,, | Performed by: FAMILY MEDICINE

## 2021-09-28 PROCEDURE — 80053 COMPREHEN METABOLIC PANEL: CPT | Performed by: FAMILY MEDICINE

## 2021-09-28 PROCEDURE — 99395 PR PREVENTIVE VISIT,EST,18-39: ICD-10-PCS | Mod: S$PBB,,, | Performed by: FAMILY MEDICINE

## 2021-09-28 PROCEDURE — 99395 PREV VISIT EST AGE 18-39: CPT | Mod: S$PBB,,, | Performed by: FAMILY MEDICINE

## 2021-09-28 RX ORDER — LEVOCETIRIZINE DIHYDROCHLORIDE 5 MG/1
TABLET, FILM COATED ORAL
Qty: 180 TABLET | Refills: 3 | Status: SHIPPED | OUTPATIENT
Start: 2021-09-28 | End: 2024-01-24 | Stop reason: SDUPTHER

## 2021-09-28 RX ORDER — AZELASTINE 1 MG/ML
1 SPRAY, METERED NASAL 2 TIMES DAILY
Qty: 30 ML | Refills: 11 | Status: SHIPPED | OUTPATIENT
Start: 2021-09-28 | End: 2022-09-28

## 2021-12-10 ENCOUNTER — OFFICE VISIT (OUTPATIENT)
Dept: URGENT CARE | Facility: CLINIC | Age: 26
End: 2021-12-10
Payer: COMMERCIAL

## 2021-12-10 VITALS
BODY MASS INDEX: 21.76 KG/M2 | HEIGHT: 75 IN | HEART RATE: 60 BPM | RESPIRATION RATE: 20 BRPM | WEIGHT: 175 LBS | SYSTOLIC BLOOD PRESSURE: 122 MMHG | OXYGEN SATURATION: 98 % | DIASTOLIC BLOOD PRESSURE: 81 MMHG | TEMPERATURE: 100 F

## 2021-12-10 DIAGNOSIS — J32.9 SINUSITIS, UNSPECIFIED CHRONICITY, UNSPECIFIED LOCATION: ICD-10-CM

## 2021-12-10 DIAGNOSIS — H66.001 ACUTE SUPPURATIVE OTITIS MEDIA OF RIGHT EAR WITHOUT SPONTANEOUS RUPTURE OF TYMPANIC MEMBRANE, RECURRENCE NOT SPECIFIED: Primary | ICD-10-CM

## 2021-12-10 LAB
CTP QC/QA: YES
SARS-COV-2 RDRP RESP QL NAA+PROBE: NEGATIVE

## 2021-12-10 PROCEDURE — 99203 PR OFFICE/OUTPT VISIT, NEW, LEVL III, 30-44 MIN: ICD-10-PCS | Mod: S$GLB,,, | Performed by: NURSE PRACTITIONER

## 2021-12-10 PROCEDURE — 99203 OFFICE O/P NEW LOW 30 MIN: CPT | Mod: S$GLB,,, | Performed by: NURSE PRACTITIONER

## 2021-12-10 PROCEDURE — U0002: ICD-10-PCS | Mod: QW,S$GLB,, | Performed by: NURSE PRACTITIONER

## 2021-12-10 PROCEDURE — U0002 COVID-19 LAB TEST NON-CDC: HCPCS | Mod: QW,S$GLB,, | Performed by: NURSE PRACTITIONER

## 2021-12-10 RX ORDER — AMOXICILLIN AND CLAVULANATE POTASSIUM 875; 125 MG/1; MG/1
1 TABLET, FILM COATED ORAL 2 TIMES DAILY
Qty: 20 TABLET | Refills: 0 | Status: SHIPPED | OUTPATIENT
Start: 2021-12-10 | End: 2021-12-20

## 2023-12-06 ENCOUNTER — OFFICE VISIT (OUTPATIENT)
Dept: URGENT CARE | Facility: CLINIC | Age: 28
End: 2023-12-06
Payer: COMMERCIAL

## 2023-12-06 VITALS
OXYGEN SATURATION: 98 % | TEMPERATURE: 99 F | DIASTOLIC BLOOD PRESSURE: 74 MMHG | HEART RATE: 79 BPM | BODY MASS INDEX: 21.77 KG/M2 | HEIGHT: 75 IN | RESPIRATION RATE: 16 BRPM | WEIGHT: 175.06 LBS | SYSTOLIC BLOOD PRESSURE: 121 MMHG

## 2023-12-06 DIAGNOSIS — J01.00 ACUTE MAXILLARY SINUSITIS, RECURRENCE NOT SPECIFIED: ICD-10-CM

## 2023-12-06 DIAGNOSIS — R05.9 COUGH, UNSPECIFIED TYPE: Primary | ICD-10-CM

## 2023-12-06 LAB
CTP QC/QA: YES
POC MOLECULAR INFLUENZA A AGN: NEGATIVE
POC MOLECULAR INFLUENZA B AGN: NEGATIVE

## 2023-12-06 PROCEDURE — 87502 INFLUENZA DNA AMP PROBE: CPT | Mod: QW,S$GLB,, | Performed by: FAMILY MEDICINE

## 2023-12-06 PROCEDURE — 87502 POCT INFLUENZA A/B MOLECULAR: ICD-10-PCS | Mod: QW,S$GLB,, | Performed by: FAMILY MEDICINE

## 2023-12-06 PROCEDURE — 99213 OFFICE O/P EST LOW 20 MIN: CPT | Mod: S$GLB,,, | Performed by: FAMILY MEDICINE

## 2023-12-06 PROCEDURE — 99213 PR OFFICE/OUTPT VISIT, EST, LEVL III, 20-29 MIN: ICD-10-PCS | Mod: S$GLB,,, | Performed by: FAMILY MEDICINE

## 2023-12-06 RX ORDER — AMOXICILLIN AND CLAVULANATE POTASSIUM 875; 125 MG/1; MG/1
1 TABLET, FILM COATED ORAL EVERY 12 HOURS
Qty: 14 TABLET | Refills: 0 | Status: SHIPPED | OUTPATIENT
Start: 2023-12-06 | End: 2023-12-13

## 2023-12-06 RX ORDER — PREDNISONE 20 MG/1
40 TABLET ORAL DAILY
Qty: 10 TABLET | Refills: 0 | Status: SHIPPED | OUTPATIENT
Start: 2023-12-06 | End: 2023-12-11

## 2023-12-06 NOTE — PROGRESS NOTES
"Subjective:      Patient ID: Kendell Harley is a 28 y.o. male.    Vitals:  height is 6' 3" (1.905 m) and weight is 79.4 kg (175 lb 0.7 oz). His oral temperature is 98.5 °F (36.9 °C). His blood pressure is 121/74 and his pulse is 79. His respiration is 16 and oxygen saturation is 98%.     Chief Complaint: Sinus Problem    Pt presents scratchy throat, cough, chills, rhinorrhea, and watery eyes. Sx began around thanksgiving severity has  waxed an waned-- for two weeks, now sinus drainage thick yellow. Chills at night. No cp, sob. . Pt took at home covid test last night that was negative. Has hx of allergic rhinitis    Sinus Problem  This is a new problem. The current episode started 1 to 4 weeks ago. The problem has been waxing and waning since onset. Associated symptoms include chills, congestion, coughing (productive), headaches and a sore throat. Pertinent negatives include no ear pain, sinus pressure or swollen glands. Treatments tried: mucinex. The treatment provided no relief.       Constitution: Positive for chills.   HENT:  Positive for congestion and sore throat. Negative for ear pain and sinus pressure.    Respiratory:  Positive for cough (productive).    Neurological:  Positive for headaches.      Objective:     Physical Exam  Constitutional: Pt oriented to person, place, and time.  Non-toxic appearance.   Patient does not appear ill. No distress. normal  HENT: No icterus or facial swelling appreciated  Head: Normocephalic and atraumatic. MMM  Ears:  Left: TM without erythema, bulging or retraction. EAC without drainage or debris/cerumen impaction or swelling, external ear structures normal  Right: TM without erythema, bulging or retraction. EAC without drainage or debris/cerumen impaction or swelling, external ear structures normal  Oropharynx: pharynx/tonsils without erythema or exudates. No uvular shift or soft palate swelling. No stridor    Nose: + congestion. TTP overlying maxillary " sinuses  Pulmonary/Chest: Effort normal. No stridor. No respiratory distress.   Abdominal: Normal appearance. Abdomen exhibits no distension.   Musculoskeletal:         General: No swelling.   Neurological: no focal deficit. Patient is alert and oriented to person, place, and time.   Skin: Skin is not diaphoretic and not pale. no jaundice  Psychiatric: Patients behavior is normal. Mood, judgment and thought content normal.     Assessment:     1. Cough, unspecified type    2. Acute maxillary sinusitis, recurrence not specified        Plan:       Cough, unspecified type  -     POCT Influenza A/B MOLECULAR    Acute maxillary sinusitis, recurrence not specified  -     amoxicillin-clavulanate 875-125mg (AUGMENTIN) 875-125 mg per tablet; Take 1 tablet by mouth every 12 (twelve) hours. for 7 days  Dispense: 14 tablet; Refill: 0  -     predniSONE (DELTASONE) 20 MG tablet; Take 2 tablets (40 mg total) by mouth once daily. for 5 days  Dispense: 10 tablet; Refill: 0

## 2023-12-14 ENCOUNTER — TELEPHONE (OUTPATIENT)
Dept: URGENT CARE | Facility: CLINIC | Age: 28
End: 2023-12-14
Payer: COMMERCIAL

## 2023-12-14 NOTE — TELEPHONE ENCOUNTER
S/w pt - was seen on 12/06/2023 & to day is 12/14/2023. Pt stated he still didn't feel well with a cold & congestion. I informed pt that he would need to come back in to be re-evaluated.

## 2023-12-22 ENCOUNTER — OFFICE VISIT (OUTPATIENT)
Dept: INTERNAL MEDICINE | Facility: CLINIC | Age: 28
End: 2023-12-22
Payer: COMMERCIAL

## 2023-12-22 ENCOUNTER — LAB VISIT (OUTPATIENT)
Dept: LAB | Facility: HOSPITAL | Age: 28
End: 2023-12-22
Attending: FAMILY MEDICINE
Payer: COMMERCIAL

## 2023-12-22 VITALS
OXYGEN SATURATION: 98 % | HEIGHT: 75 IN | BODY MASS INDEX: 20.86 KG/M2 | HEART RATE: 81 BPM | WEIGHT: 167.75 LBS | DIASTOLIC BLOOD PRESSURE: 88 MMHG | SYSTOLIC BLOOD PRESSURE: 120 MMHG

## 2023-12-22 DIAGNOSIS — Z11.3 ENCOUNTER FOR SCREENING EXAMINATION FOR SEXUALLY TRANSMITTED DISEASE: ICD-10-CM

## 2023-12-22 DIAGNOSIS — Z76.89 ENCOUNTER TO ESTABLISH CARE WITH NEW DOCTOR: ICD-10-CM

## 2023-12-22 DIAGNOSIS — Z00.00 ENCOUNTER FOR ANNUAL GENERAL MEDICAL EXAMINATION WITHOUT ABNORMAL FINDINGS IN ADULT: ICD-10-CM

## 2023-12-22 DIAGNOSIS — Z23 NEED FOR VACCINATION: ICD-10-CM

## 2023-12-22 DIAGNOSIS — R09.81 SINUS CONGESTION: ICD-10-CM

## 2023-12-22 DIAGNOSIS — Z00.00 ENCOUNTER FOR ANNUAL GENERAL MEDICAL EXAMINATION WITHOUT ABNORMAL FINDINGS IN ADULT: Primary | ICD-10-CM

## 2023-12-22 DIAGNOSIS — J30.2 SEASONAL ALLERGIC RHINITIS, UNSPECIFIED TRIGGER: ICD-10-CM

## 2023-12-22 PROBLEM — T63.441A STING FROM HORNET, WASP, OR BEE: Status: RESOLVED | Noted: 2017-07-07 | Resolved: 2023-12-22

## 2023-12-22 PROBLEM — T63.451A STING FROM HORNET, WASP, OR BEE: Status: RESOLVED | Noted: 2017-07-07 | Resolved: 2023-12-22

## 2023-12-22 PROBLEM — T63.461A STING FROM HORNET, WASP, OR BEE: Status: RESOLVED | Noted: 2017-07-07 | Resolved: 2023-12-22

## 2023-12-22 LAB
ALBUMIN SERPL BCP-MCNC: 4 G/DL (ref 3.5–5.2)
ALP SERPL-CCNC: 70 U/L (ref 55–135)
ALT SERPL W/O P-5'-P-CCNC: 24 U/L (ref 10–44)
ANION GAP SERPL CALC-SCNC: 11 MMOL/L (ref 8–16)
AST SERPL-CCNC: 27 U/L (ref 10–40)
BILIRUB SERPL-MCNC: 0.5 MG/DL (ref 0.1–1)
BUN SERPL-MCNC: 13 MG/DL (ref 6–20)
CALCIUM SERPL-MCNC: 9.9 MG/DL (ref 8.7–10.5)
CHLORIDE SERPL-SCNC: 104 MMOL/L (ref 95–110)
CHOLEST SERPL-MCNC: 190 MG/DL (ref 120–199)
CHOLEST/HDLC SERPL: 2.8 {RATIO} (ref 2–5)
CO2 SERPL-SCNC: 25 MMOL/L (ref 23–29)
CREAT SERPL-MCNC: 1 MG/DL (ref 0.5–1.4)
CTP QC/QA: YES
CTP QC/QA: YES
ERYTHROCYTE [DISTWIDTH] IN BLOOD BY AUTOMATED COUNT: 12.8 % (ref 11.5–14.5)
EST. GFR  (NO RACE VARIABLE): >60 ML/MIN/1.73 M^2
ESTIMATED AVG GLUCOSE: 105 MG/DL (ref 68–131)
FLUAV AG NPH QL: NEGATIVE
FLUBV AG NPH QL: NEGATIVE
GLUCOSE SERPL-MCNC: 72 MG/DL (ref 70–110)
HAV IGM SERPL QL IA: NORMAL
HBA1C MFR BLD: 5.3 % (ref 4–5.6)
HBV CORE IGM SERPL QL IA: NORMAL
HBV SURFACE AG SERPL QL IA: NORMAL
HCT VFR BLD AUTO: 46.2 % (ref 40–54)
HCV AB SERPL QL IA: NORMAL
HDLC SERPL-MCNC: 68 MG/DL (ref 40–75)
HDLC SERPL: 35.8 % (ref 20–50)
HGB BLD-MCNC: 15.7 G/DL (ref 14–18)
HIV 1+2 AB+HIV1 P24 AG SERPL QL IA: NORMAL
LDLC SERPL CALC-MCNC: 112.4 MG/DL (ref 63–159)
MCH RBC QN AUTO: 31.7 PG (ref 27–31)
MCHC RBC AUTO-ENTMCNC: 34 G/DL (ref 32–36)
MCV RBC AUTO: 93 FL (ref 82–98)
NONHDLC SERPL-MCNC: 122 MG/DL
PLATELET # BLD AUTO: 313 K/UL (ref 150–450)
PMV BLD AUTO: 8.8 FL (ref 9.2–12.9)
POTASSIUM SERPL-SCNC: 4.4 MMOL/L (ref 3.5–5.1)
PROT SERPL-MCNC: 7.7 G/DL (ref 6–8.4)
RBC # BLD AUTO: 4.95 M/UL (ref 4.6–6.2)
SARS-COV-2 RDRP RESP QL NAA+PROBE: NEGATIVE
SODIUM SERPL-SCNC: 140 MMOL/L (ref 136–145)
T4 FREE SERPL-MCNC: 0.9 NG/DL (ref 0.71–1.51)
TRIGL SERPL-MCNC: 48 MG/DL (ref 30–150)
TSH SERPL DL<=0.005 MIU/L-ACNC: 0.64 UIU/ML (ref 0.4–4)
WBC # BLD AUTO: 3.7 K/UL (ref 3.9–12.7)

## 2023-12-22 PROCEDURE — 90471 IMMUNIZATION ADMIN: CPT | Mod: S$GLB,,, | Performed by: FAMILY MEDICINE

## 2023-12-22 PROCEDURE — 3074F SYST BP LT 130 MM HG: CPT | Mod: CPTII,S$GLB,, | Performed by: FAMILY MEDICINE

## 2023-12-22 PROCEDURE — 80074 ACUTE HEPATITIS PANEL: CPT | Performed by: FAMILY MEDICINE

## 2023-12-22 PROCEDURE — 99214 PR OFFICE/OUTPT VISIT, EST, LEVL IV, 30-39 MIN: ICD-10-PCS | Mod: 25,S$GLB,, | Performed by: FAMILY MEDICINE

## 2023-12-22 PROCEDURE — 3079F DIAST BP 80-89 MM HG: CPT | Mod: CPTII,S$GLB,, | Performed by: FAMILY MEDICINE

## 2023-12-22 PROCEDURE — 86696 HERPES SIMPLEX TYPE 2 TEST: CPT | Performed by: FAMILY MEDICINE

## 2023-12-22 PROCEDURE — 3008F BODY MASS INDEX DOCD: CPT | Mod: CPTII,S$GLB,, | Performed by: FAMILY MEDICINE

## 2023-12-22 PROCEDURE — 90715 TDAP VACCINE 7 YRS/> IM: CPT | Mod: S$GLB,,, | Performed by: FAMILY MEDICINE

## 2023-12-22 PROCEDURE — 87635: ICD-10-PCS | Mod: QW,S$GLB,, | Performed by: FAMILY MEDICINE

## 2023-12-22 PROCEDURE — 87804 INFLUENZA ASSAY W/OPTIC: CPT | Mod: QW,S$GLB,, | Performed by: FAMILY MEDICINE

## 2023-12-22 PROCEDURE — 99999 PR PBB SHADOW E&M-EST. PATIENT-LVL IV: ICD-10-PCS | Mod: PBBFAC,,, | Performed by: FAMILY MEDICINE

## 2023-12-22 PROCEDURE — 90715 TDAP VACCINE GREATER THAN OR EQUAL TO 7YO IM: ICD-10-PCS | Mod: S$GLB,,, | Performed by: FAMILY MEDICINE

## 2023-12-22 PROCEDURE — 85027 COMPLETE CBC AUTOMATED: CPT | Performed by: FAMILY MEDICINE

## 2023-12-22 PROCEDURE — 80061 LIPID PANEL: CPT | Performed by: FAMILY MEDICINE

## 2023-12-22 PROCEDURE — 87635 SARS-COV-2 COVID-19 AMP PRB: CPT | Mod: QW,S$GLB,, | Performed by: FAMILY MEDICINE

## 2023-12-22 PROCEDURE — 1159F MED LIST DOCD IN RCRD: CPT | Mod: CPTII,S$GLB,, | Performed by: FAMILY MEDICINE

## 2023-12-22 PROCEDURE — 99395 PREV VISIT EST AGE 18-39: CPT | Mod: 25,S$GLB,, | Performed by: FAMILY MEDICINE

## 2023-12-22 PROCEDURE — 87389 HIV-1 AG W/HIV-1&-2 AB AG IA: CPT | Performed by: FAMILY MEDICINE

## 2023-12-22 PROCEDURE — 84443 ASSAY THYROID STIM HORMONE: CPT | Performed by: FAMILY MEDICINE

## 2023-12-22 PROCEDURE — 3008F PR BODY MASS INDEX (BMI) DOCUMENTED: ICD-10-PCS | Mod: CPTII,S$GLB,, | Performed by: FAMILY MEDICINE

## 2023-12-22 PROCEDURE — 86694 HERPES SIMPLEX NES ANTBDY: CPT | Performed by: FAMILY MEDICINE

## 2023-12-22 PROCEDURE — 3079F PR MOST RECENT DIASTOLIC BLOOD PRESSURE 80-89 MM HG: ICD-10-PCS | Mod: CPTII,S$GLB,, | Performed by: FAMILY MEDICINE

## 2023-12-22 PROCEDURE — 84439 ASSAY OF FREE THYROXINE: CPT | Performed by: FAMILY MEDICINE

## 2023-12-22 PROCEDURE — 99214 OFFICE O/P EST MOD 30 MIN: CPT | Mod: 25,S$GLB,, | Performed by: FAMILY MEDICINE

## 2023-12-22 PROCEDURE — 80053 COMPREHEN METABOLIC PANEL: CPT | Performed by: FAMILY MEDICINE

## 2023-12-22 PROCEDURE — 1160F RVW MEDS BY RX/DR IN RCRD: CPT | Mod: CPTII,S$GLB,, | Performed by: FAMILY MEDICINE

## 2023-12-22 PROCEDURE — 83036 HEMOGLOBIN GLYCOSYLATED A1C: CPT | Performed by: FAMILY MEDICINE

## 2023-12-22 PROCEDURE — 3074F PR MOST RECENT SYSTOLIC BLOOD PRESSURE < 130 MM HG: ICD-10-PCS | Mod: CPTII,S$GLB,, | Performed by: FAMILY MEDICINE

## 2023-12-22 PROCEDURE — 87804 POCT INFLUENZA A/B: ICD-10-PCS | Mod: QW,S$GLB,, | Performed by: FAMILY MEDICINE

## 2023-12-22 PROCEDURE — 90471 TDAP VACCINE GREATER THAN OR EQUAL TO 7YO IM: ICD-10-PCS | Mod: S$GLB,,, | Performed by: FAMILY MEDICINE

## 2023-12-22 PROCEDURE — 36415 COLL VENOUS BLD VENIPUNCTURE: CPT | Performed by: FAMILY MEDICINE

## 2023-12-22 PROCEDURE — 1159F PR MEDICATION LIST DOCUMENTED IN MEDICAL RECORD: ICD-10-PCS | Mod: CPTII,S$GLB,, | Performed by: FAMILY MEDICINE

## 2023-12-22 PROCEDURE — 1160F PR REVIEW ALL MEDS BY PRESCRIBER/CLIN PHARMACIST DOCUMENTED: ICD-10-PCS | Mod: CPTII,S$GLB,, | Performed by: FAMILY MEDICINE

## 2023-12-22 PROCEDURE — 99395 PR PREVENTIVE VISIT,EST,18-39: ICD-10-PCS | Mod: 25,S$GLB,, | Performed by: FAMILY MEDICINE

## 2023-12-22 PROCEDURE — 99999 PR PBB SHADOW E&M-EST. PATIENT-LVL IV: CPT | Mod: PBBFAC,,, | Performed by: FAMILY MEDICINE

## 2023-12-22 PROCEDURE — 86592 SYPHILIS TEST NON-TREP QUAL: CPT | Performed by: FAMILY MEDICINE

## 2023-12-22 RX ORDER — MONTELUKAST SODIUM 10 MG/1
10 TABLET ORAL NIGHTLY
Qty: 90 TABLET | Refills: 3 | Status: SHIPPED | OUTPATIENT
Start: 2023-12-22 | End: 2024-01-24

## 2023-12-22 NOTE — PROGRESS NOTES
"Subjective:     Patient ID: Kendell Harley is a 28 y.o. male.   Chief Complaint: Sinus Problem and Establish Care    HPI:  Patient presents to establish care.    Pt was seen on 12/[facility] at [facility] for sinus congestion. At that time he'd had about 7 days of sx. Home COVID test was negative. POCT Flu was negative. He was treated with augmentin x7 days and prednisone x5 days.    On 12/14 pt called back, reporting continuation of sx after completion of abx. He was advised he needed reevaluation.    Presents today with almost complete resolution of sx. Feeling energy has returned.    Sinus problems come and go. Taking xyzal and astelin. Has used flonase in the past. Has switched between other antihistamines as well. Summer is when his sx are usually the worst. No prior allergy testing.      Review of Systems   Constitutional:  Negative for chills, fatigue and fever.   HENT:  Negative for nasal congestion, ear pain, postnasal drip and sore throat.    Eyes:  Negative for visual disturbance.   Respiratory:  Negative for cough, shortness of breath and wheezing.    Cardiovascular:  Negative for chest pain and palpitations.   Gastrointestinal:  Negative for abdominal pain, change in bowel habit, constipation, diarrhea, nausea and vomiting.   Genitourinary:  Negative for dysuria and hematuria.   Musculoskeletal:  Negative for back pain, leg pain and neck pain.   Neurological:  Negative for dizziness, numbness and headaches.   Hematological:  Negative for adenopathy.   Psychiatric/Behavioral:  Negative for dysphoric mood, sleep disturbance and suicidal ideas. The patient is not nervous/anxious.           Objective:      Vitals:    12/22/23 0810   BP: 120/88   BP Location: Left arm   Patient Position: Sitting   BP Method: Medium (Manual)   Pulse: 81   SpO2: 98%   Weight: 76.1 kg (167 lb 12.3 oz)   Height: 6' 3" (1.905 m)      Physical Exam  Vitals and nursing note reviewed.   Constitutional:       General: He is not in acute " distress.     Appearance: Normal appearance. He is not ill-appearing.   HENT:      Head: Normocephalic and atraumatic.      Right Ear: Tympanic membrane, ear canal and external ear normal. There is no impacted cerumen.      Left Ear: Tympanic membrane, ear canal and external ear normal. There is no impacted cerumen.      Nose: Nose normal. No congestion or rhinorrhea.      Right Sinus: No maxillary sinus tenderness or frontal sinus tenderness.      Left Sinus: No maxillary sinus tenderness or frontal sinus tenderness.      Mouth/Throat:      Mouth: Mucous membranes are moist.      Pharynx: Oropharynx is clear. No oropharyngeal exudate or posterior oropharyngeal erythema.   Eyes:      General: No scleral icterus.        Right eye: No discharge.         Left eye: No discharge.      Conjunctiva/sclera: Conjunctivae normal.   Neck:      Thyroid: No thyroid mass, thyromegaly or thyroid tenderness.   Cardiovascular:      Rate and Rhythm: Normal rate and regular rhythm.      Pulses: Normal pulses.      Heart sounds: Normal heart sounds. No murmur heard.     No friction rub. No gallop.   Pulmonary:      Effort: Pulmonary effort is normal. No respiratory distress.      Breath sounds: Normal breath sounds. No wheezing, rhonchi or rales.   Abdominal:      General: Abdomen is flat. Bowel sounds are normal.      Palpations: Abdomen is soft.      Tenderness: There is no abdominal tenderness.   Musculoskeletal:         General: No swelling or deformity. Normal range of motion.      Cervical back: Normal range of motion and neck supple. No tenderness.   Lymphadenopathy:      Cervical: No cervical adenopathy.   Skin:     General: Skin is warm and dry.   Neurological:      General: No focal deficit present.      Mental Status: He is alert and oriented to person, place, and time. Mental status is at baseline.      Gait: Gait normal.      Deep Tendon Reflexes: Reflexes normal.   Psychiatric:         Mood and Affect: Mood normal.          Behavior: Behavior normal.         Thought Content: Thought content normal.         Judgment: Judgment normal.       Rapid Influenza A Ag   Date/Time Value Ref Range Status   12/22/2023 08:48 AM Negative Negative Final     Rapid Influenza B Ag   Date/Time Value Ref Range Status   12/22/2023 08:48 AM Negative Negative Final     POC Rapid COVID   Date/Time Value Ref Range Status   12/22/2023 08:48 AM Negative Negative Final             Assessment:       Problem List Items Addressed This Visit          ENT    Allergic rhinitis    Relevant Medications    montelukast (SINGULAIR) 10 mg tablet    Other Relevant Orders    Ambulatory referral/consult to Allergy     Other Visit Diagnoses       Encounter for annual general medical examination without abnormal findings in adult    -  Primary    Relevant Orders    Comprehensive Metabolic Panel    CBC Without Differential    Lipid Panel    Hemoglobin A1C    TSH    T4, Free    Encounter to establish care with new doctor        Sinus congestion        Relevant Orders    POCT Influenza A/B Rapid Antigen (Completed)    POCT COVID-19 Rapid Screening (Completed)    Encounter for screening examination for sexually transmitted disease        Relevant Orders    C. trachomatis/N. gonorrhoeae by AMP DNA [facility]; Urine    RPR    HIV 1/2 Ag/Ab (4th Gen)    Herpes simplex type 1&2 IgG,Herpes titer    Hepatitis panel, acute    Herpes simplex type 1 & 2 IgM,Herpes IgM    Need for vaccination        Relevant Orders    (In Office Administered) Tdap Vaccine              Plan:       1. Encounter for annual general medical examination without abnormal findings in adult  Health maintenance updated  Chronic issues reviewed  Fasting labs ordered    -     Comprehensive Metabolic Panel; Future; Expected date: 12/22/2023  -     CBC Without Differential; Future; Expected date: 12/22/2023  -     Lipid Panel; Future; Expected date: 12/22/2023  -     Hemoglobin A1C; Future; Expected date: 12/22/2023  -      TSH; Future; Expected date: 12/22/2023  -     T4, Free; Future; Expected date: 12/22/2023    2. Encounter to establish care with new doctor  Reviewed chronic medical conditions, updated problem list and medication list    3. Seasonal allergic rhinitis, unspecified trigger  Continue xyzal and astelin, will add singulair d/t refractory sx  Discussed options to send to allergy vs ENT  Ref to allergy for testing and/or immunotherapy given his significant seasonal sx in the summer  Consider ENT eval after if negative findings or continued issues    -     montelukast (SINGULAIR) 10 mg tablet; Take 1 tablet (10 mg total) by mouth every evening.  Dispense: 90 tablet; Refill: 3  -     Ambulatory referral/consult to Allergy; Future; Expected date: 12/29/2023    4. Sinus congestion  Negative swabs today    -     POCT Influenza A/B Rapid Antigen  -     POCT COVID-19 Rapid Screening    5. Encounter for screening examination for sexually transmitted disease  STD panel at pt's request, no symptoms    -     C. trachomatis/N. gonorrhoeae by AMP DNA [facility]; Urine; Future; Expected date: 12/22/2023  -     RPR; Future; Expected date: 12/22/2023  -     HIV 1/2 Ag/Ab (4th Gen); Future; Expected date: 12/22/2023  -     Herpes simplex type 1&2 IgG,Herpes titer; Future; Expected date: 12/22/2023  -     Hepatitis panel, acute; Future; Expected date: 12/22/2023  -     Herpes simplex type 1 & 2 IgM,Herpes IgM; Future; Expected date: 12/22/2023    6. Need for vaccination  Tetanus booster administered today.    -     (In Office Administered) Tdap Vaccine           Follow up in about 1 year (around 12/22/2024).     Karthik Islas MD, FAAFP  Family Medicine Physician  [facility]  12/22/2023

## 2023-12-23 LAB — RPR SER QL: NORMAL

## 2023-12-26 LAB
HSV AB, IGM BY EIA: 0.26 INDEX
HSV1 IGG SERPL QL IA: POSITIVE
HSV2 IGG SERPL QL IA: NEGATIVE

## 2024-01-24 ENCOUNTER — OFFICE VISIT (OUTPATIENT)
Dept: ALLERGY | Facility: CLINIC | Age: 29
End: 2024-01-24
Payer: COMMERCIAL

## 2024-01-24 VITALS
DIASTOLIC BLOOD PRESSURE: 74 MMHG | HEART RATE: 63 BPM | SYSTOLIC BLOOD PRESSURE: 124 MMHG | BODY MASS INDEX: 21.58 KG/M2 | WEIGHT: 172.63 LBS

## 2024-01-24 DIAGNOSIS — J30.9 CHRONIC ALLERGIC RHINITIS: Primary | ICD-10-CM

## 2024-01-24 DIAGNOSIS — J30.1 NON-SEASONAL ALLERGIC RHINITIS DUE TO POLLEN: ICD-10-CM

## 2024-01-24 DIAGNOSIS — J30.1 SEASONAL ALLERGIC RHINITIS DUE TO POLLEN: ICD-10-CM

## 2024-01-24 DIAGNOSIS — R51.9 FACE PAIN: ICD-10-CM

## 2024-01-24 PROCEDURE — 1160F RVW MEDS BY RX/DR IN RCRD: CPT | Mod: CPTII,S$GLB,, | Performed by: STUDENT IN AN ORGANIZED HEALTH CARE EDUCATION/TRAINING PROGRAM

## 2024-01-24 PROCEDURE — 99999 PR PBB SHADOW E&M-EST. PATIENT-LVL III: CPT | Mod: PBBFAC,,, | Performed by: STUDENT IN AN ORGANIZED HEALTH CARE EDUCATION/TRAINING PROGRAM

## 2024-01-24 PROCEDURE — 3008F BODY MASS INDEX DOCD: CPT | Mod: CPTII,S$GLB,, | Performed by: STUDENT IN AN ORGANIZED HEALTH CARE EDUCATION/TRAINING PROGRAM

## 2024-01-24 PROCEDURE — 99203 OFFICE O/P NEW LOW 30 MIN: CPT | Mod: S$GLB,,, | Performed by: STUDENT IN AN ORGANIZED HEALTH CARE EDUCATION/TRAINING PROGRAM

## 2024-01-24 PROCEDURE — 3078F DIAST BP <80 MM HG: CPT | Mod: CPTII,S$GLB,, | Performed by: STUDENT IN AN ORGANIZED HEALTH CARE EDUCATION/TRAINING PROGRAM

## 2024-01-24 PROCEDURE — 1159F MED LIST DOCD IN RCRD: CPT | Mod: CPTII,S$GLB,, | Performed by: STUDENT IN AN ORGANIZED HEALTH CARE EDUCATION/TRAINING PROGRAM

## 2024-01-24 PROCEDURE — 3074F SYST BP LT 130 MM HG: CPT | Mod: CPTII,S$GLB,, | Performed by: STUDENT IN AN ORGANIZED HEALTH CARE EDUCATION/TRAINING PROGRAM

## 2024-01-24 RX ORDER — AZELASTINE 1 MG/ML
2 SPRAY, METERED NASAL 2 TIMES DAILY PRN
Qty: 60 ML | Refills: 5 | Status: SHIPPED | OUTPATIENT
Start: 2024-01-24 | End: 2025-01-23

## 2024-01-24 RX ORDER — LEVOCETIRIZINE DIHYDROCHLORIDE 5 MG/1
TABLET, FILM COATED ORAL
Qty: 90 TABLET | Refills: 5 | Status: SHIPPED | OUTPATIENT
Start: 2024-01-24

## 2024-01-24 RX ORDER — AZELASTINE 1 MG/ML
2 SPRAY, METERED NASAL 2 TIMES DAILY
COMMUNITY
End: 2024-01-24 | Stop reason: DRUGHIGH

## 2024-01-24 NOTE — PROGRESS NOTES
Allergy Clinic Note  [facility]    This note was created by combination of typed  and M-Modal dictation. Transcription errors may be present.  If there are any questions, please contact me.    Subjective:      Patient ID: Kendell Hraley is a 28 y.o. male.    Chief Complaint: Allergic Rhinitis       Referring Provider: Karthik Islas    History of Present Illness: Kendell Harley is a 28 y.o. male lifetime nonsmoker referred by PCP for continuing care of chronic allergic rhinitis.    Related medications and other interventions  Xyzal 5 mg   Azelastine  Montelukast 10 mg      01/24/2024:  At initial visit, Mr Harley described a several year history of chronic rhinitis with chief complaint of runny nose.  Additional symptoms include nasal congestion, runny eyes, sneezing, early morning facial pressure.  Symptoms are worst in late spring and early summer.  Precipitants include cats, dust, installation material, being outside, hot temperatures, and weather changes.  Has been helped by azelastine as needed.  He has not been helped by Singulair, Claritin, Xyzal, Benadryl, or irregular use of Flonase.  He was seen by my [facility] colleague for rhinitis in 2019 and had inhalant ImmunoCAP positive for trees, grasses, and weeds.  He is satisfied with his current level of symptom control.       MEDICAL HISTORY      Significant past medical history:  None  Active problem list reviewed  ENT surgery:  None   Significant family history:  Exposures:  Works both indoors and outdoors.   Runs for time on a regular basis,  Smoking Hx:  Client  reports that he has never smoked. He has been exposed to tobacco smoke. He has never used smokeless tobacco.    Meds: MAR reviewed    Asthma:  No  Eczema:  No  Rhinitis:  Yes.  See HPI  Drug allergy/intolerance:  NKDA  Venom allergy:  No  Latex allergy:  No    Patient Active Problem List   Diagnosis    Allergic rhinitis     Medication List with  Changes/Refills   New Medications    AZELASTINE (ASTELIN) 137 MCG (0.1 %) NASAL SPRAY    2 sprays (274 mcg total) by Nasal route 2 (two) times daily as needed for Rhinitis. Donot snort (because it tastes bad)       Start Date: 1/24/2024 End Date: 1/23/2025   Changed and/or Refilled Medications    Modified Medication Previous Medication    LEVOCETIRIZINE (XYZAL) 5 MG TABLET levocetirizine (XYZAL) 5 MG tablet       Take 2 tablets in the morning.Take 2 tablets in the morning.    Take 2 tablets in the morning.       Start Date: 1/24/2024 End Date: --    Start Date: 9/28/2021 End Date: 1/24/2024   Discontinued Medications    AZELASTINE (ASTELIN) 137 MCG (0.1 %) NASAL SPRAY    1 spray (137 mcg total) by Nasal route 2 (two) times daily.       Start Date: 9/28/2021 End Date: 9/28/2022    AZELASTINE (ASTELIN) 137 MCG (0.1 %) NASAL SPRAY    2 sprays by Nasal route 2 (two) times daily.       Start Date: --        End Date: 1/24/2024    MONTELUKAST (SINGULAIR) 10 MG TABLET    Take 1 tablet (10 mg total) by mouth every evening.       Start Date: 12/22/2023End Date: 1/24/2024         REVIEW OF SYSTEMS      CONST: no F/C/NS, no unintentional weight changes  NEURO:  no tremor, no weakness  EYES: no discharge, no erythema  EARS: no hearing loss, no sensation of fullness  PULM:  no SOB, no wheezing, no cough  CV: no CP, no palpitations  DERM: no rashes, no skin breaks    PHYSICAL EXAM     /74 (BP Location: Left arm, Patient Position: Sitting)   Pulse 63   Wt 78.3 kg (172 lb 9.9 oz)   BMI 21.58 kg/m²   GEN: Awake and alert, no distress, slender  DERM: No flushing, No rashes  EYE:  No occular discharge  HEENT: No nasal discharge, no hoarseness, moderate to severe nasal turbinates swelling  PULM: Normal work of breathing, no cough  NEURO:  No focal deficit, speech fluent and logical  PSYCH: appropriate affect, normal behavior    MEDICAL DECISION MAKING     Data reviewed (new entries in bold-face)      Allergy Testing       Inhalent Immunocaps positive for trees, grasses and weeds, 2019      Lab results      EO count 300, 2021      Imaging and other diagnostics            Medical records review   At initial visit reviewed allergy consult by my colleague Dr Cohen from 9/11/2019 for rhinitis.  Immunocap testing as above.  Diagnoses:     Kendell Harley is a 28 y.o. male. with  1. Chronic allergic rhinitis    2. Seasonal allergic rhinitis due to tree and weed pollen    3. Non-seasonal allergic rhinitis due to grass pollen    4. Face pain          Plan:         Chronic allergic rhinitis due to tree, grass and weed pollen  -     Ambulatory referral/consult to Allergy  -     azelastine (ASTELIN) 137 mcg (0.1 %) nasal spray; 2 sprays (274 mcg total) by Nasal route 2 (two) times daily as needed for Rhinitis. Donot snort (because it tastes bad)  Dispense: 60 mL; Refill: 5  -     levocetirizine (XYZAL) 5 MG tablet; Take 2 tablets in the morning.Take 2 tablets in the morning.  Dispense: 90 tablet; Refill: 5    Face pain probably due to nasal turbinate hypertrophy from allergies       -     Noted.  Consider nasal steroid if worsens.        Comorbidities  None    PATIENT INSTRUCTIONS AND FOLLOW-UP     Patient Instructions       Continue Xyzal and azelastine    Follow-up yearly or sooner if needed    Follow up in about 1 year (around 1/24/2025).        Erin Leonardo MD  Allergy, Asthma & Immunology

## 2024-06-10 ENCOUNTER — PATIENT MESSAGE (OUTPATIENT)
Dept: INTERNAL MEDICINE | Facility: CLINIC | Age: 29
End: 2024-06-10
Payer: COMMERCIAL

## 2024-07-03 ENCOUNTER — HOSPITAL ENCOUNTER (EMERGENCY)
Facility: HOSPITAL | Age: 29
Discharge: HOME | End: 2024-07-03
Attending: EMERGENCY MEDICINE
Payer: COMMERCIAL

## 2024-07-03 VITALS
OXYGEN SATURATION: 98 % | DIASTOLIC BLOOD PRESSURE: 72 MMHG | BODY MASS INDEX: 20 KG/M2 | HEART RATE: 91 BPM | SYSTOLIC BLOOD PRESSURE: 132 MMHG | TEMPERATURE: 98 F | RESPIRATION RATE: 15 BRPM | WEIGHT: 160 LBS

## 2024-07-03 DIAGNOSIS — R53.83 FATIGUE, UNSPECIFIED TYPE: Primary | ICD-10-CM

## 2024-07-03 LAB
ALBUMIN SERPL BCP-MCNC: 4.4 G/DL (ref 3.5–5.2)
ALP SERPL-CCNC: 73 U/L (ref 55–135)
ALT SERPL W/O P-5'-P-CCNC: 20 U/L (ref 10–44)
ANION GAP SERPL CALC-SCNC: 13 MMOL/L (ref 8–16)
AST SERPL-CCNC: 33 U/L (ref 10–40)
BASOPHILS # BLD AUTO: 0.05 K/UL (ref 0–0.2)
BASOPHILS NFR BLD: 0.4 % (ref 0–1.9)
BILIRUB SERPL-MCNC: 1.5 MG/DL (ref 0.1–1)
BUN SERPL-MCNC: 13 MG/DL (ref 6–20)
CALCIUM SERPL-MCNC: 10 MG/DL (ref 8.7–10.5)
CHLORIDE SERPL-SCNC: 103 MMOL/L (ref 95–110)
CO2 SERPL-SCNC: 19 MMOL/L (ref 23–29)
CREAT SERPL-MCNC: 1 MG/DL (ref 0.5–1.4)
DIFFERENTIAL METHOD BLD: ABNORMAL
EOSINOPHIL # BLD AUTO: 0.1 K/UL (ref 0–0.5)
EOSINOPHIL NFR BLD: 0.7 % (ref 0–8)
ERYTHROCYTE [DISTWIDTH] IN BLOOD BY AUTOMATED COUNT: 12.6 % (ref 11.5–14.5)
EST. GFR  (NO RACE VARIABLE): >60 ML/MIN/1.73 M^2
GLUCOSE SERPL-MCNC: 93 MG/DL (ref 70–110)
HCT VFR BLD AUTO: 46.7 % (ref 40–54)
HGB BLD-MCNC: 16.5 G/DL (ref 14–18)
IMM GRANULOCYTES # BLD AUTO: 0.03 K/UL (ref 0–0.04)
IMM GRANULOCYTES NFR BLD AUTO: 0.2 % (ref 0–0.5)
INFLUENZA A, MOLECULAR: NEGATIVE
INFLUENZA B, MOLECULAR: NEGATIVE
LYMPHOCYTES # BLD AUTO: 2 K/UL (ref 1–4.8)
LYMPHOCYTES NFR BLD: 15.7 % (ref 18–48)
MCH RBC QN AUTO: 32.5 PG (ref 27–31)
MCHC RBC AUTO-ENTMCNC: 35.3 G/DL (ref 32–36)
MCV RBC AUTO: 92 FL (ref 82–98)
MONOCYTES # BLD AUTO: 1 K/UL (ref 0.3–1)
MONOCYTES NFR BLD: 7.7 % (ref 4–15)
NEUTROPHILS # BLD AUTO: 9.3 K/UL (ref 1.8–7.7)
NEUTROPHILS NFR BLD: 75.3 % (ref 38–73)
NRBC BLD-RTO: 0 /100 WBC
PLATELET # BLD AUTO: 220 K/UL (ref 150–450)
PMV BLD AUTO: 9.2 FL (ref 9.2–12.9)
POTASSIUM SERPL-SCNC: 3.9 MMOL/L (ref 3.5–5.1)
PROT SERPL-MCNC: 7.9 G/DL (ref 6–8.4)
RBC # BLD AUTO: 5.08 M/UL (ref 4.6–6.2)
SARS-COV-2 RDRP RESP QL NAA+PROBE: NEGATIVE
SODIUM SERPL-SCNC: 135 MMOL/L (ref 136–145)
SPECIMEN SOURCE: NORMAL
WBC # BLD AUTO: 12.39 K/UL (ref 3.9–12.7)

## 2024-07-03 PROCEDURE — 96375 TX/PRO/DX INJ NEW DRUG ADDON: CPT

## 2024-07-03 PROCEDURE — 87502 INFLUENZA DNA AMP PROBE: CPT | Performed by: PHYSICIAN ASSISTANT

## 2024-07-03 PROCEDURE — 63600175 PHARM REV CODE 636 W HCPCS: Performed by: PHYSICIAN ASSISTANT

## 2024-07-03 PROCEDURE — 96374 THER/PROPH/DIAG INJ IV PUSH: CPT

## 2024-07-03 PROCEDURE — 80053 COMPREHEN METABOLIC PANEL: CPT | Performed by: PHYSICIAN ASSISTANT

## 2024-07-03 PROCEDURE — U0002 COVID-19 LAB TEST NON-CDC: HCPCS | Performed by: PHYSICIAN ASSISTANT

## 2024-07-03 PROCEDURE — 99284 EMERGENCY DEPT VISIT MOD MDM: CPT | Mod: 25

## 2024-07-03 PROCEDURE — 25000003 PHARM REV CODE 250: Performed by: PHYSICIAN ASSISTANT

## 2024-07-03 PROCEDURE — 96361 HYDRATE IV INFUSION ADD-ON: CPT

## 2024-07-03 PROCEDURE — 85025 COMPLETE CBC W/AUTO DIFF WBC: CPT | Performed by: PHYSICIAN ASSISTANT

## 2024-07-03 RX ORDER — KETOROLAC TROMETHAMINE 10 MG/1
10 TABLET, FILM COATED ORAL EVERY 6 HOURS PRN
Qty: 20 TABLET | Refills: 0 | Status: SHIPPED | OUTPATIENT
Start: 2024-07-03 | End: 2024-07-08

## 2024-07-03 RX ORDER — ACETAMINOPHEN 500 MG
1000 TABLET ORAL
Status: COMPLETED | OUTPATIENT
Start: 2024-07-03 | End: 2024-07-03

## 2024-07-03 RX ORDER — KETOROLAC TROMETHAMINE 30 MG/ML
10 INJECTION, SOLUTION INTRAMUSCULAR; INTRAVENOUS
Status: COMPLETED | OUTPATIENT
Start: 2024-07-03 | End: 2024-07-03

## 2024-07-03 RX ORDER — ONDANSETRON HYDROCHLORIDE 2 MG/ML
4 INJECTION, SOLUTION INTRAVENOUS
Status: COMPLETED | OUTPATIENT
Start: 2024-07-03 | End: 2024-07-03

## 2024-07-03 RX ORDER — ONDANSETRON 4 MG/1
4 TABLET, ORALLY DISINTEGRATING ORAL EVERY 6 HOURS PRN
Qty: 15 TABLET | Refills: 0 | Status: SHIPPED | OUTPATIENT
Start: 2024-07-03

## 2024-07-03 RX ORDER — DROPERIDOL 2.5 MG/ML
1.25 INJECTION, SOLUTION INTRAMUSCULAR; INTRAVENOUS
Status: COMPLETED | OUTPATIENT
Start: 2024-07-03 | End: 2024-07-03

## 2024-07-03 RX ADMIN — ACETAMINOPHEN 1000 MG: 500 TABLET ORAL at 04:07

## 2024-07-03 RX ADMIN — DROPERIDOL 1.25 MG: 2.5 INJECTION, SOLUTION INTRAMUSCULAR; INTRAVENOUS at 04:07

## 2024-07-03 RX ADMIN — KETOROLAC TROMETHAMINE 10 MG: 30 INJECTION, SOLUTION INTRAMUSCULAR; INTRAVENOUS at 02:07

## 2024-07-03 RX ADMIN — ONDANSETRON 4 MG: 2 INJECTION INTRAMUSCULAR; INTRAVENOUS at 02:07

## 2024-07-03 RX ADMIN — SODIUM CHLORIDE, POTASSIUM CHLORIDE, SODIUM LACTATE AND CALCIUM CHLORIDE 1000 ML: 600; 310; 30; 20 INJECTION, SOLUTION INTRAVENOUS at 02:07

## 2024-07-03 NOTE — ED PROVIDER NOTES
Encounter Date: 7/3/2024       History     Chief Complaint   Patient presents with    Flu sysmptoms     Patient reports has been having generalized weakness, sinus congestion for the last week, worse today.     Healthy 28-year-old male presents for fatigue and malaise for 1 week with nausea, vomiting, diarrhea, headache can not body aches.  He denies abdominal pain, fever, visual changes, numbness/tingling or weakness, neck pain or stiffness.      Review of patient's allergies indicates:  No Known Allergies  Past Medical History:   Diagnosis Date    Allergy     Mononucleosis     diagnosed 9/20/12    Recurrent upper respiratory infection (URI)     Sting from hornet, wasp, or bee 07/07/2017     History reviewed. No pertinent surgical history.  Family History   Problem Relation Name Age of Onset    Diabetes Mother N/a     Hypertension Mother N/a     Hypertension Father N/a     Diabetes Maternal Grandmother N/a     Hypertension Maternal Grandmother N/a     Cancer Maternal Grandmother N/a     Hypertension Paternal Grandmother N/a     Sudden death Neg Hx      Congenital heart disease Neg Hx       Social History     Tobacco Use    Smoking status: Never     Passive exposure: Yes    Smokeless tobacco: Never    Tobacco comments:     father smokes   Substance Use Topics    Alcohol use: Not Currently    Drug use: No     Review of Systems    Physical Exam     Initial Vitals [07/03/24 0218]   BP Pulse Resp Temp SpO2   118/79 92 18 98.4 °F (36.9 °C) 96 %      MAP       --         Physical Exam    Nursing note and vitals reviewed.  Constitutional: He appears well-developed and well-nourished. He is not diaphoretic. No distress.   HENT:   Head: Normocephalic and atraumatic.   Eyes: EOM are normal. Pupils are equal, round, and reactive to light.   Neck: Neck supple.   Normal range of motion.  Cardiovascular:  Normal rate, regular rhythm, normal heart sounds and intact distal pulses.     Exam reveals no gallop and no friction rub.        No murmur heard.  Pulmonary/Chest: Breath sounds normal. No respiratory distress. He has no wheezes. He has no rhonchi. He has no rales. He exhibits no tenderness.   Abdominal: Abdomen is soft. Bowel sounds are normal. He exhibits no distension and no mass. There is no abdominal tenderness. There is no rebound and no guarding.   Musculoskeletal:         General: Normal range of motion.      Cervical back: Normal range of motion and neck supple.     Neurological: He is alert and oriented to person, place, and time. He has normal strength. No cranial nerve deficit or sensory deficit. GCS score is 15. GCS eye subscore is 4. GCS verbal subscore is 5. GCS motor subscore is 6.   Skin: Skin is warm and dry.   Psychiatric: He has a normal mood and affect.         ED Course   Procedures  Labs Reviewed   CBC W/ AUTO DIFFERENTIAL - Abnormal; Notable for the following components:       Result Value    MCH 32.5 (*)     Gran # (ANC) 9.3 (*)     Gran % 75.3 (*)     Lymph % 15.7 (*)     All other components within normal limits   COMPREHENSIVE METABOLIC PANEL - Abnormal; Notable for the following components:    Sodium 135 (*)     CO2 19 (*)     Total Bilirubin 1.5 (*)     All other components within normal limits   INFLUENZA A & B BY MOLECULAR   SARS-COV-2 RNA AMPLIFICATION, QUAL          Imaging Results    None          Medications   lactated ringers bolus 1,000 mL (0 mLs Intravenous Stopped 7/3/24 0359)   ondansetron injection 4 mg (4 mg Intravenous Given 7/3/24 0257)   ketorolac injection 9.999 mg (9.999 mg Intravenous Given 7/3/24 0257)   acetaminophen tablet 1,000 mg (1,000 mg Oral Given 7/3/24 0400)   droPERidol injection 1.25 mg (1.25 mg Intravenous Given 7/3/24 0405)     Medical Decision Making  28-year-old male presenting for fatigue with body aches and nausea/vomiting/diarrhea.  Vitals are normal, he appears uncomfortable but nontoxic.    Differential diagnosis:  Influenza   COVID-19   Dehydration    Gastroenteritis    Will check labs, give fluids, analgesics reassess.    Patient feeling better after therapy.  Workup is reassuring.  Will discharge with instructions to follow up with PCP return to the ED for worsening symptoms.    Amount and/or Complexity of Data Reviewed  Labs: ordered. Decision-making details documented in ED Course.    Risk  OTC drugs.  Prescription drug management.               ED Course as of 07/03/24 0544   Wed Jul 03, 2024   0303 SARS-CoV-2 RNA, Amplification, Qual: Negative [CC]   0305 Influenza A, Molecular: Negative [CC]   0305 Influenza B, Molecular: Negative [CC]   0344 Patient reports some minimal improvement of symptoms after therapy.  Will give additional medication [CC]   0406 WBC: 12.39 [CC]   0406 Hemoglobin: 16.5 [CC]   0428 Creatinine: 1.0 [CC]   0428 BILIRUBIN TOTAL(!): 1.5 [CC]      ED Course User Index  [CC] Pamela Hsu PA-C                           Clinical Impression:  Final diagnoses:  [R53.83] Fatigue, unspecified type (Primary)          ED Disposition Condition    Discharge Stable          ED Prescriptions       Medication Sig Dispense Start Date End Date Auth. Provider    ondansetron (ZOFRAN-ODT) 4 MG TbDL Take 1 tablet (4 mg total) by mouth every 6 (six) hours as needed (Nausea). 15 tablet 7/3/2024 -- Pamela Hsu PA-C    ketorolac (TORADOL) 10 mg tablet Take 1 tablet (10 mg total) by mouth every 6 (six) hours as needed for Pain. 20 tablet 7/3/2024 7/8/2024 Pamela Hsu PA-C          Follow-up Information       Follow up With Specialties Details Why Contact Info    Karthik Islas MD Internal Medicine Schedule an appointment as soon as possible for a visit in 1 week  [address]  [place] [address]  [phone]      [facility] - Emergency Dept Emergency Medicine Go to  If symptoms worsen [address] [facility] [id number] [phone]             Pamela Hsu PA-C  07/03/24 0544

## 2024-07-03 NOTE — DISCHARGE INSTRUCTIONS
Diagnosis: Fatigue    I am not sure what is causing your symptoms.  Your lab tests show some signs of dehydration but no other concerning findings.  Your COVID and flu tests were negative.  I am prescribing medicine for nausea and pain that you can take as needed.  Make sure to stay hydrated get plenty of rest.    Please schedule an appointment with your primary care doctor for follow-up. If you start to have any new or worsening symptoms, please come back to the emergency department.

## 2024-11-06 ENCOUNTER — LAB VISIT (OUTPATIENT)
Dept: LAB | Facility: HOSPITAL | Age: 29
End: 2024-11-06
Payer: COMMERCIAL

## 2024-11-06 ENCOUNTER — PATIENT MESSAGE (OUTPATIENT)
Dept: INTERNAL MEDICINE | Facility: CLINIC | Age: 29
End: 2024-11-06

## 2024-11-06 ENCOUNTER — OFFICE VISIT (OUTPATIENT)
Dept: INTERNAL MEDICINE | Facility: CLINIC | Age: 29
End: 2024-11-06
Payer: COMMERCIAL

## 2024-11-06 VITALS
SYSTOLIC BLOOD PRESSURE: 132 MMHG | OXYGEN SATURATION: 98 % | DIASTOLIC BLOOD PRESSURE: 80 MMHG | HEART RATE: 86 BPM | BODY MASS INDEX: 20.75 KG/M2 | WEIGHT: 166.88 LBS | HEIGHT: 75 IN

## 2024-11-06 DIAGNOSIS — Z00.00 ENCOUNTER FOR ANNUAL GENERAL MEDICAL EXAMINATION WITHOUT ABNORMAL FINDINGS IN ADULT: Primary | ICD-10-CM

## 2024-11-06 DIAGNOSIS — Z11.3 ENCOUNTER FOR SCREENING FOR INFECTIONS WITH A PREDOMINANTLY SEXUAL MODE OF TRANSMISSION: ICD-10-CM

## 2024-11-06 DIAGNOSIS — Z23 NEED FOR VACCINATION: ICD-10-CM

## 2024-11-06 DIAGNOSIS — Z11.3 ENCOUNTER FOR SCREENING EXAMINATION FOR SEXUALLY TRANSMITTED DISEASE: Primary | ICD-10-CM

## 2024-11-06 DIAGNOSIS — Z00.00 ENCOUNTER FOR ANNUAL GENERAL MEDICAL EXAMINATION WITHOUT ABNORMAL FINDINGS IN ADULT: ICD-10-CM

## 2024-11-06 LAB
ALBUMIN SERPL BCP-MCNC: 4.1 G/DL (ref 3.5–5.2)
ALP SERPL-CCNC: 73 U/L (ref 40–150)
ALT SERPL W/O P-5'-P-CCNC: 12 U/L (ref 10–44)
ANION GAP SERPL CALC-SCNC: 10 MMOL/L (ref 8–16)
AST SERPL-CCNC: 22 U/L (ref 10–40)
BASOPHILS # BLD AUTO: 0.06 K/UL (ref 0–0.2)
BASOPHILS NFR BLD: 1.4 % (ref 0–1.9)
BILIRUB SERPL-MCNC: 0.6 MG/DL (ref 0.1–1)
BUN SERPL-MCNC: 11 MG/DL (ref 6–20)
CALCIUM SERPL-MCNC: 9.5 MG/DL (ref 8.7–10.5)
CHLORIDE SERPL-SCNC: 104 MMOL/L (ref 95–110)
CO2 SERPL-SCNC: 25 MMOL/L (ref 23–29)
CREAT SERPL-MCNC: 1.1 MG/DL (ref 0.5–1.4)
DIFFERENTIAL METHOD BLD: ABNORMAL
EOSINOPHIL # BLD AUTO: 0.4 K/UL (ref 0–0.5)
EOSINOPHIL NFR BLD: 8 % (ref 0–8)
ERYTHROCYTE [DISTWIDTH] IN BLOOD BY AUTOMATED COUNT: 12.8 % (ref 11.5–14.5)
EST. GFR  (NO RACE VARIABLE): >60 ML/MIN/1.73 M^2
ESTIMATED AVG GLUCOSE: 100 MG/DL (ref 68–131)
GLUCOSE SERPL-MCNC: 73 MG/DL (ref 70–110)
HBA1C MFR BLD: 5.1 % (ref 4–5.6)
HCT VFR BLD AUTO: 47 % (ref 40–54)
HGB BLD-MCNC: 16.3 G/DL (ref 14–18)
IMM GRANULOCYTES # BLD AUTO: 0.01 K/UL (ref 0–0.04)
IMM GRANULOCYTES NFR BLD AUTO: 0.2 % (ref 0–0.5)
LYMPHOCYTES # BLD AUTO: 2 K/UL (ref 1–4.8)
LYMPHOCYTES NFR BLD: 46.4 % (ref 18–48)
MCH RBC QN AUTO: 32.1 PG (ref 27–31)
MCHC RBC AUTO-ENTMCNC: 34.7 G/DL (ref 32–36)
MCV RBC AUTO: 93 FL (ref 82–98)
MONOCYTES # BLD AUTO: 0.4 K/UL (ref 0.3–1)
MONOCYTES NFR BLD: 8.7 % (ref 4–15)
NEUTROPHILS # BLD AUTO: 1.5 K/UL (ref 1.8–7.7)
NEUTROPHILS NFR BLD: 35.3 % (ref 38–73)
NRBC BLD-RTO: 0 /100 WBC
PLATELET # BLD AUTO: 227 K/UL (ref 150–450)
PMV BLD AUTO: 9.3 FL (ref 9.2–12.9)
POTASSIUM SERPL-SCNC: 4.2 MMOL/L (ref 3.5–5.1)
PROT SERPL-MCNC: 7.1 G/DL (ref 6–8.4)
RBC # BLD AUTO: 5.08 M/UL (ref 4.6–6.2)
SODIUM SERPL-SCNC: 139 MMOL/L (ref 136–145)
T4 FREE SERPL-MCNC: 0.9 NG/DL (ref 0.71–1.51)
TSH SERPL DL<=0.005 MIU/L-ACNC: 0.63 UIU/ML (ref 0.4–4)
WBC # BLD AUTO: 4.35 K/UL (ref 3.9–12.7)

## 2024-11-06 PROCEDURE — 84439 ASSAY OF FREE THYROXINE: CPT | Performed by: FAMILY MEDICINE

## 2024-11-06 PROCEDURE — 1159F MED LIST DOCD IN RCRD: CPT | Mod: CPTII,S$GLB,, | Performed by: FAMILY MEDICINE

## 2024-11-06 PROCEDURE — 1160F RVW MEDS BY RX/DR IN RCRD: CPT | Mod: CPTII,S$GLB,, | Performed by: FAMILY MEDICINE

## 2024-11-06 PROCEDURE — 3008F BODY MASS INDEX DOCD: CPT | Mod: CPTII,S$GLB,, | Performed by: FAMILY MEDICINE

## 2024-11-06 PROCEDURE — 3044F HG A1C LEVEL LT 7.0%: CPT | Mod: CPTII,S$GLB,, | Performed by: FAMILY MEDICINE

## 2024-11-06 PROCEDURE — 80053 COMPREHEN METABOLIC PANEL: CPT | Performed by: FAMILY MEDICINE

## 2024-11-06 PROCEDURE — 3079F DIAST BP 80-89 MM HG: CPT | Mod: CPTII,S$GLB,, | Performed by: FAMILY MEDICINE

## 2024-11-06 PROCEDURE — 3075F SYST BP GE 130 - 139MM HG: CPT | Mod: CPTII,S$GLB,, | Performed by: FAMILY MEDICINE

## 2024-11-06 PROCEDURE — 83036 HEMOGLOBIN GLYCOSYLATED A1C: CPT | Performed by: FAMILY MEDICINE

## 2024-11-06 PROCEDURE — 84443 ASSAY THYROID STIM HORMONE: CPT | Performed by: FAMILY MEDICINE

## 2024-11-06 PROCEDURE — 99999 PR PBB SHADOW E&M-EST. PATIENT-LVL III: CPT | Mod: PBBFAC,,, | Performed by: FAMILY MEDICINE

## 2024-11-06 PROCEDURE — 99395 PREV VISIT EST AGE 18-39: CPT | Mod: S$GLB,,, | Performed by: FAMILY MEDICINE

## 2024-11-06 PROCEDURE — 85025 COMPLETE CBC W/AUTO DIFF WBC: CPT | Performed by: FAMILY MEDICINE

## 2024-11-06 NOTE — PROGRESS NOTES
Subjective:     Patient ID: Kendell Harley is a 29 y.o. male.   Chief Complaint: Annual Exam    HPI:  History of Present Illness    CHIEF COMPLAINT:  Patient presents for an annual wellness visit and routine checkup.    HPI:  Patient reports having a  daughter who is 3-4 weeks old. Patient mentions elevated blood pressure during doctor's office visits. No other specific symptoms or health concerns are reported. Patient confirms stable health status since the last visit and denies any changes to medical history, new surgeries, or changes to family medical history. Patient denies taking any current medications.          Review of Problems & History:  Patient Active Problem List   Diagnosis    Allergic rhinitis      Past Medical History:   Diagnosis Date    Allergy     Mononucleosis     diagnosed 12    Recurrent upper respiratory infection (URI)     Sting from hornet, wasp, or bee 2017      History reviewed. No pertinent surgical history.   Social History     Socioeconomic History    Marital status: Single   Tobacco Use    Smoking status: Never     Passive exposure: Yes    Smokeless tobacco: Never    Tobacco comments:     father smokes   Substance and Sexual Activity    Alcohol use: Not Currently    Drug use: No    Sexual activity: Yes     Partners: Female     Birth control/protection: Condom     Social Drivers of Health     Financial Resource Strain: High Risk (2023)    Overall Financial Resource Strain (CARDIA)     Difficulty of Paying Living Expenses: Hard   Food Insecurity: Food Insecurity Present (2023)    Hunger Vital Sign     Worried About Running Out of Food in the Last Year: Sometimes true     Ran Out of Food in the Last Year: Sometimes true   Transportation Needs: No Transportation Needs (2023)    PRAPARE - Transportation     Lack of Transportation (Medical): No     Lack of Transportation (Non-Medical): No   Physical Activity: Sufficiently Active (2023)    Exercise  "Vital Sign     Days of Exercise per Week: 7 days     Minutes of Exercise per Session: 50 min   Stress: Stress Concern Present (12/22/2023)    [employer] - Occupational Stress Questionnaire     Feeling of Stress : Very much   Housing Stability: Low Risk  (12/22/2023)    Housing Stability Vital Sign     Unable to Pay for Housing in the Last Year: No     Number of Places Lived in the Last Year: 1     Unstable Housing in the Last Year: No        Medication Review:    Current Outpatient Medications:     azelastine (ASTELIN) 137 mcg (0.1 %) nasal spray, 2 sprays (274 mcg total) by Nasal route 2 (two) times daily as needed for Rhinitis. Donot snort (because it tastes bad), Disp: 60 mL, Rfl: 5     Review of Systems   Constitutional:  Negative for chills, fatigue and fever.   HENT:  Negative for nasal congestion, ear pain, postnasal drip and sore throat.    Eyes:  Negative for visual disturbance.   Respiratory:  Negative for cough, shortness of breath and wheezing.    Cardiovascular:  Negative for chest pain and palpitations.   Gastrointestinal:  Negative for abdominal pain, change in bowel habit, constipation, diarrhea, nausea and vomiting.   Genitourinary:  Negative for dysuria and hematuria.   Musculoskeletal:  Negative for back pain, leg pain and neck pain.   Neurological:  Negative for dizziness, numbness and headaches.   Hematological:  Negative for adenopathy.   Psychiatric/Behavioral:  Negative for dysphoric mood, sleep disturbance and suicidal ideas. The patient is not nervous/anxious.           Objective:      Vitals:    11/06/24 1329 11/06/24 1346   BP: (!) 142/80 132/80   BP Location: Left arm    Patient Position: Sitting    Pulse: 86    SpO2: 98%    Weight: 75.7 kg (166 lb 14.2 oz)    Height: 6' 3" (1.905 m)       Physical Exam  Vitals reviewed.   Constitutional:       General: He is not in acute distress.     Appearance: Normal appearance. He is normal weight. He is not " ill-appearing.   HENT:      Head: Normocephalic and atraumatic.      Right Ear: Tympanic membrane, ear canal and external ear normal. There is no impacted cerumen.      Left Ear: Tympanic membrane, ear canal and external ear normal. There is no impacted cerumen.      Nose: Nose normal. No congestion or rhinorrhea.      Mouth/Throat:      Mouth: Mucous membranes are moist.      Pharynx: Oropharynx is clear. No oropharyngeal exudate or posterior oropharyngeal erythema.   Eyes:      General: No scleral icterus.        Right eye: No discharge.         Left eye: No discharge.      Conjunctiva/sclera: Conjunctivae normal.   Neck:      Thyroid: No thyroid mass, thyromegaly or thyroid tenderness.   Cardiovascular:      Rate and Rhythm: Normal rate and regular rhythm.      Pulses: Normal pulses.      Heart sounds: Normal heart sounds. No murmur heard.     No friction rub. No gallop.   Pulmonary:      Effort: Pulmonary effort is normal. No respiratory distress.      Breath sounds: Normal breath sounds. No stridor. No wheezing, rhonchi or rales.   Abdominal:      General: Abdomen is flat. Bowel sounds are normal. There is no distension.      Palpations: Abdomen is soft. There is no mass.      Tenderness: There is no abdominal tenderness. There is no guarding.      Hernia: No hernia is present.   Musculoskeletal:         General: No swelling or deformity. Normal range of motion.      Cervical back: Normal range of motion and neck supple. No tenderness.   Lymphadenopathy:      Cervical: No cervical adenopathy.   Skin:     General: Skin is warm and dry.   Neurological:      General: No focal deficit present.      Mental Status: He is alert and oriented to person, place, and time. Mental status is at baseline.      Gait: Gait normal.      Deep Tendon Reflexes: Reflexes normal.   Psychiatric:         Mood and Affect: Mood normal.         Behavior: Behavior normal.         Thought Content: Thought content normal.         Judgment:  Judgment normal.           Assessment/Plan:             ICD-10-CM ICD-9-CM   1. Encounter for annual general medical examination without abnormal findings in adult  Z00.00 V70.0   2. Encounter for screening for infections with a predominantly sexual mode of transmission  Z11.3 V74.5   3. Need for vaccination  Z23 V05.9     Orders Placed This Encounter   Procedures    Comprehensive Metabolic Panel    CBC Auto Differential    Hemoglobin A1C    TSH    T4, Free       Assessment & Plan    - Conducted annual checkup, noting patient's overall health is good  - Assessed blood pressure, initially elevated but normalized during visit, consistent with previous readings  - Reviewed recent cholesterol results, determining no need for repeat testing this year  - Considered flu vaccination due to patient's   - Evaluated need for other routine screenings, determining patient is not yet due for colonoscopy or prostate exam    SCREENING:  - A1C ordered for diabetes screening.  - Thyroid check ordered.  - CBC and metabolic panel ordered.    FLU VACCINATION:  - Flu shot administered in office.      FOLLOW UP:  - Will review lab results and send a message within the next week.  - Patient to contact the office with any questions about lab results.                   No follow-ups on file.     Karthik Islas MD, FAAFP  Family Medicine Physician  [facility]  2024    This note was generated with the assistance of ambient listening technology. Verbal consent was obtained by the patient and accompanying visitor(s) for the recording of patient appointment to facilitate this note. I attest to having reviewed and edited the generated note for accuracy, though some syntax or spelling errors may persist. Please contact the author of this note for any clarification.

## 2024-11-08 NOTE — TELEPHONE ENCOUNTER
Lov 11/6/24  Pt thought STI testing was ordered at his visit this week. He would like orders. I pended hiv,C. trachomatis/N. gonorrhoeae and treponema palligium AB. Please add any other orders needed

## 2024-11-11 ENCOUNTER — LAB VISIT (OUTPATIENT)
Dept: LAB | Facility: HOSPITAL | Age: 29
End: 2024-11-11
Attending: FAMILY MEDICINE
Payer: COMMERCIAL

## 2024-11-11 DIAGNOSIS — Z11.3 ENCOUNTER FOR SCREENING EXAMINATION FOR SEXUALLY TRANSMITTED DISEASE: ICD-10-CM

## 2024-11-11 LAB
HIV 1+2 AB+HIV1 P24 AG SERPL QL IA: NORMAL
TREPONEMA PALLIDUM IGG+IGM AB [PRESENCE] IN SERUM OR PLASMA BY IMMUNOASSAY: NONREACTIVE

## 2024-11-11 PROCEDURE — 36415 COLL VENOUS BLD VENIPUNCTURE: CPT | Performed by: FAMILY MEDICINE

## 2024-11-11 PROCEDURE — 86593 SYPHILIS TEST NON-TREP QUANT: CPT | Performed by: FAMILY MEDICINE

## 2024-11-11 PROCEDURE — 87389 HIV-1 AG W/HIV-1&-2 AB AG IA: CPT | Performed by: FAMILY MEDICINE

## 2024-11-15 ENCOUNTER — PATIENT MESSAGE (OUTPATIENT)
Dept: INTERNAL MEDICINE | Facility: CLINIC | Age: 29
End: 2024-11-15
Payer: COMMERCIAL

## 2024-11-15 DIAGNOSIS — F32.A DEPRESSION, UNSPECIFIED DEPRESSION TYPE: Primary | ICD-10-CM

## 2024-11-18 NOTE — TELEPHONE ENCOUNTER
Pt is requesting to see a therapist for depression, pended referral     LOV with Karthik Islas MD , 11/6/2024

## 2024-12-02 ENCOUNTER — PATIENT MESSAGE (OUTPATIENT)
Dept: INTERNAL MEDICINE | Facility: CLINIC | Age: 29
End: 2024-12-02
Payer: COMMERCIAL

## 2024-12-02 DIAGNOSIS — F32.A DEPRESSION, UNSPECIFIED DEPRESSION TYPE: Primary | ICD-10-CM

## 2024-12-02 NOTE — TELEPHONE ENCOUNTER
Pt requesting referral to psychiatry, pended. Second attempt     LOV with Karthik Islas MD , 11/6/2024

## 2025-01-13 ENCOUNTER — OFFICE VISIT (OUTPATIENT)
Dept: PSYCHIATRY | Facility: CLINIC | Age: 30
End: 2025-01-13
Payer: COMMERCIAL

## 2025-01-13 VITALS
SYSTOLIC BLOOD PRESSURE: 123 MMHG | BODY MASS INDEX: 21.81 KG/M2 | WEIGHT: 175.38 LBS | DIASTOLIC BLOOD PRESSURE: 60 MMHG | HEART RATE: 66 BPM | HEIGHT: 75 IN

## 2025-01-13 DIAGNOSIS — F41.9 ANXIETY: Primary | ICD-10-CM

## 2025-01-13 DIAGNOSIS — F12.90 CANNABIS USE, UNCOMPLICATED: ICD-10-CM

## 2025-01-13 PROCEDURE — 3078F DIAST BP <80 MM HG: CPT | Mod: CPTII,S$GLB,,

## 2025-01-13 PROCEDURE — 3008F BODY MASS INDEX DOCD: CPT | Mod: CPTII,S$GLB,,

## 2025-01-13 PROCEDURE — 99999 PR PBB SHADOW E&M-EST. PATIENT-LVL II: CPT | Mod: PBBFAC,,,

## 2025-01-13 PROCEDURE — 3074F SYST BP LT 130 MM HG: CPT | Mod: CPTII,S$GLB,,

## 2025-01-13 PROCEDURE — 99205 OFFICE O/P NEW HI 60 MIN: CPT | Mod: S$GLB,,,
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
## 2025-01-15 NOTE — PROGRESS NOTES
[facility] Psychiatry resident Jerardo Mobley MD  discussed his assessment and plan for this case.  As well, I also went to see pt myself and reviewed and concur with resident note, assessment, and plan.    Ashkan Alvarado MD [facility]  Attending Psychiatrist   [facility]

## 2025-06-05 ENCOUNTER — PATIENT MESSAGE (OUTPATIENT)
Dept: PSYCHIATRY | Facility: CLINIC | Age: 30
End: 2025-06-05
Payer: COMMERCIAL

## 2025-06-17 ENCOUNTER — OFFICE VISIT (OUTPATIENT)
Dept: URGENT CARE | Facility: CLINIC | Age: 30
End: 2025-06-17
Payer: COMMERCIAL

## 2025-06-17 VITALS
OXYGEN SATURATION: 98 % | SYSTOLIC BLOOD PRESSURE: 124 MMHG | RESPIRATION RATE: 20 BRPM | TEMPERATURE: 98 F | HEIGHT: 75 IN | BODY MASS INDEX: 19.89 KG/M2 | DIASTOLIC BLOOD PRESSURE: 81 MMHG | HEART RATE: 76 BPM | WEIGHT: 160 LBS

## 2025-06-17 DIAGNOSIS — J32.9 BACTERIAL SINUSITIS: Primary | ICD-10-CM

## 2025-06-17 DIAGNOSIS — R05.9 COUGH, UNSPECIFIED TYPE: ICD-10-CM

## 2025-06-17 DIAGNOSIS — B96.89 BACTERIAL SINUSITIS: Primary | ICD-10-CM

## 2025-06-17 LAB
CTP QC/QA: YES
SARS-COV+SARS-COV-2 AG RESP QL IA.RAPID: NEGATIVE

## 2025-06-17 PROCEDURE — 99204 OFFICE O/P NEW MOD 45 MIN: CPT | Mod: S$GLB,,, | Performed by: NURSE PRACTITIONER

## 2025-06-17 PROCEDURE — 87811 SARS-COV-2 COVID19 W/OPTIC: CPT | Mod: QW,S$GLB,, | Performed by: NURSE PRACTITIONER

## 2025-06-17 RX ORDER — AZELASTINE 1 MG/ML
1 SPRAY, METERED NASAL 2 TIMES DAILY
Qty: 30 ML | Refills: 0 | Status: SHIPPED | OUTPATIENT
Start: 2025-06-17 | End: 2026-06-17

## 2025-06-17 RX ORDER — AMOXICILLIN AND CLAVULANATE POTASSIUM 875; 125 MG/1; MG/1
1 TABLET, FILM COATED ORAL EVERY 12 HOURS
Qty: 14 TABLET | Refills: 0 | Status: SHIPPED | OUTPATIENT
Start: 2025-06-17 | End: 2025-06-24

## 2025-06-17 NOTE — PROGRESS NOTES
"Subjective:      Patient ID: Kendell Harley is a 29 y.o. male.    Vitals:  height is 6' 3" (1.905 m) and weight is 72.6 kg (160 lb). His oral temperature is 98.2 °F (36.8 °C). His blood pressure is 124/81 and his pulse is 76. His respiration is 20 and oxygen saturation is 98%.     Chief Complaint: Cough    This is a 29 y.o. male who presents today with a chief complaint of coughing, runny nose, sinus pressure, sneezing, nasal congestion, runny eyes that began 1 month.  Pt has taken OTC Mucinex (last dose @9PM yesterday), Benadryl (last dose 7 days ago) to help with symptoms. Past couple of days feels like his smelling is off.    Cough  This is a new problem. The current episode started 1 to 4 weeks ago. The problem has been gradually worsening. The problem occurs constantly. The cough is Productive of sputum. Associated symptoms include nasal congestion, postnasal drip and sweats. Pertinent negatives include no chest pain, chills, ear congestion, ear pain, fever, headaches, heartburn, hemoptysis, myalgias, rash, rhinorrhea, sore throat, shortness of breath, weight loss or wheezing. Treatments tried: OTC Mucinex, Benadryl. The treatment provided mild relief. There is no history of asthma, bronchiectasis, bronchitis, COPD, emphysema, environmental allergies or pneumonia.       Constitution: Negative for chills, fatigue and fever.   HENT:  Positive for postnasal drip and sinus pressure. Negative for ear pain and sore throat.    Cardiovascular:  Negative for chest pain.   Respiratory:  Positive for cough. Negative for bloody sputum, shortness of breath and wheezing.    Gastrointestinal:  Negative for heartburn.   Musculoskeletal:  Negative for muscle ache.   Skin:  Negative for rash.   Allergic/Immunologic: Negative for environmental allergies.   Neurological:  Negative for headaches.      Objective:     Physical Exam   Constitutional: He is oriented to person, place, and time. He appears well-developed. He is " cooperative.  Non-toxic appearance. He does not appear ill. No distress.   HENT:   Head: Normocephalic and atraumatic.   Ears:   Right Ear: Hearing, external ear and ear canal normal. A middle ear effusion is present.   Left Ear: Hearing, external ear and ear canal normal. A middle ear effusion is present.   Nose: Rhinorrhea and congestion present. No mucosal edema or nasal deformity. No epistaxis. Right sinus exhibits maxillary sinus tenderness and frontal sinus tenderness. Left sinus exhibits maxillary sinus tenderness and frontal sinus tenderness.   Mouth/Throat: Uvula is midline, oropharynx is clear and moist and mucous membranes are normal. No trismus in the jaw. Normal dentition. No uvula swelling. No oropharyngeal exudate, posterior oropharyngeal edema or posterior oropharyngeal erythema.   Eyes: Conjunctivae and lids are normal. No scleral icterus.   Neck: Trachea normal and phonation normal. Neck supple. No edema present. No erythema present. No neck rigidity present.   Cardiovascular: Normal rate, regular rhythm, normal heart sounds and normal pulses.   Pulmonary/Chest: Effort normal and breath sounds normal. No stridor. No respiratory distress. He has no decreased breath sounds. He has no wheezes. He has no rhonchi. He has no rales. He exhibits no tenderness.   Abdominal: Normal appearance.   Musculoskeletal: Normal range of motion.         General: No deformity. Normal range of motion.   Neurological: He is alert and oriented to person, place, and time. He exhibits normal muscle tone. Coordination normal.   Skin: Skin is warm, dry, intact, not diaphoretic and not pale.   Psychiatric: His speech is normal and behavior is normal. Judgment and thought content normal.   Nursing note and vitals reviewed.      Assessment:     1. Bacterial sinusitis    2. Cough, unspecified type      Results for orders placed or performed in visit on 06/17/25   SARS Coronavirus 2 Antigen, POCT Manual Read    Collection Time:  06/17/25 12:42 PM   Result Value Ref Range    SARS Coronavirus 2 Antigen Negative Negative, Presumptive Negative     Acceptable Yes        Plan:   Tested for COVID to rule it out since new onset decrease in sense of smell- negative. Treating for bacterial sinusitis given duration of symptoms.     Bacterial sinusitis  -     amoxicillin-clavulanate 875-125mg (AUGMENTIN) 875-125 mg per tablet; Take 1 tablet by mouth every 12 (twelve) hours. for 7 days  Dispense: 14 tablet; Refill: 0  -     azelastine (ASTELIN) 137 mcg (0.1 %) nasal spray; 1 spray (137 mcg total) by Nasal route 2 (two) times daily.  Dispense: 30 mL; Refill: 0    Cough, unspecified type  -     SARS Coronavirus 2 Antigen, POCT Manual Read        Patient Instructions   Prescriptions sent to pharmacy:  Augmentin- antibiotic- take twice a day for 7 days. Take with food. May take a probiotic to help with GI side effects.    Other recommendations:  Oral antihistamine (Claritin, Zyrtec, Allegra,or Xyzal) for post nasal drip and runny nose  Oral decongestant (Sudafed)  for sinus congestion  Steroid nasal spray (Flonase) for runny nose and sinus congestion  Cough expectorant (Mucinex DM) to break up mucus in your chest. Take with a full glass of water    Please drink plenty of fluids.  Please get plenty of rest.  Nasal irrigation with a saline spray or Netti Pot like device per their directions is also recommended.  If you  smoke, please stop smoking.    If not allergic, take Tylenol (Acetaminophen) 650 mg to  1 g every 6 hours as needed  and/or Motrin (Ibuprofen) 600 to 800 mg every 6 hours as needed for fever or pain.      Please remember that you have received care at an urgent care today. Urgent cares are not emergency rooms and are not equipped to handle life threatening emergencies and cannot rule in or out certain medical conditions and you may be released before all of your medical problems are known or treated.     Please arrange follow up  with your primary care physician or speciality clinic within 2-5 days if your signs and symptoms have not resolved or worsen.     Please return here or go to the Emergency Department for any concerns or worsening of condition.

## 2025-06-17 NOTE — LETTER
June 17, 2025      [facility]  [address] [place] [address]  Phone: [phone]  Fax: [phone]       Patient: Kendell Harley   YOB: 1995  Date of Visit: 06/17/2025    To Whom It May Concern:    Debbie Harley  was at [facility] on 06/17/2025. The patient may return to work/school on 6/19/2025 with no restrictions. If you have any questions or concerns, or if I can be of further assistance, please do not hesitate to contact me.    Sincerely,      LINDSAY AdlerC

## 2025-06-17 NOTE — PATIENT INSTRUCTIONS
Prescriptions sent to pharmacy:  Augmentin- antibiotic- take twice a day for 7 days. Take with food. May take a probiotic to help with GI side effects.    Other recommendations:  Oral antihistamine (Claritin, Zyrtec, Allegra,or Xyzal) for post nasal drip and runny nose  Oral decongestant (Sudafed)  for sinus congestion  Steroid nasal spray (Flonase) for runny nose and sinus congestion  Cough expectorant (Mucinex DM) to break up mucus in your chest. Take with a full glass of water    Please drink plenty of fluids.  Please get plenty of rest.  Nasal irrigation with a saline spray or Netti Pot like device per their directions is also recommended.  If you  smoke, please stop smoking.    If not allergic, take Tylenol (Acetaminophen) 650 mg to  1 g every 6 hours as needed  and/or Motrin (Ibuprofen) 600 to 800 mg every 6 hours as needed for fever or pain.      Please remember that you have received care at an urgent care today. Urgent cares are not emergency rooms and are not equipped to handle life threatening emergencies and cannot rule in or out certain medical conditions and you may be released before all of your medical problems are known or treated.     Please arrange follow up with your primary care physician or speciality clinic within 2-5 days if your signs and symptoms have not resolved or worsen.     Please return here or go to the Emergency Department for any concerns or worsening of condition.